# Patient Record
Sex: FEMALE | Race: WHITE | Employment: OTHER | URBAN - METROPOLITAN AREA
[De-identification: names, ages, dates, MRNs, and addresses within clinical notes are randomized per-mention and may not be internally consistent; named-entity substitution may affect disease eponyms.]

---

## 2017-10-30 ENCOUNTER — HOSPITAL ENCOUNTER (OUTPATIENT)
Dept: LAB | Age: 67
Discharge: HOME | End: 2017-10-30
Payer: MEDICARE

## 2017-10-30 ENCOUNTER — OFFICE VISIT (OUTPATIENT)
Dept: HEMATOLOGY | Age: 67
End: 2017-10-30

## 2017-10-30 VITALS
HEIGHT: 63 IN | RESPIRATION RATE: 18 BRPM | DIASTOLIC BLOOD PRESSURE: 89 MMHG | WEIGHT: 105 LBS | SYSTOLIC BLOOD PRESSURE: 193 MMHG | HEART RATE: 74 BPM | TEMPERATURE: 96.7 F | BODY MASS INDEX: 18.61 KG/M2 | OXYGEN SATURATION: 100 %

## 2017-10-30 DIAGNOSIS — B18.2 CHRONIC HEPATITIS C WITHOUT HEPATIC COMA (HCC): Primary | ICD-10-CM

## 2017-10-30 DIAGNOSIS — B18.2 CHRONIC HEPATITIS C WITHOUT HEPATIC COMA (HCC): ICD-10-CM

## 2017-10-30 LAB
ALBUMIN SERPL-MCNC: 4.3 G/DL (ref 3.4–5)
ALBUMIN/GLOB SERPL: 1.2 {RATIO} (ref 0.8–1.7)
ALP SERPL-CCNC: 44 U/L (ref 45–117)
ALT SERPL-CCNC: 13 U/L (ref 13–56)
ANION GAP SERPL CALC-SCNC: 8 MMOL/L (ref 3–18)
AST SERPL-CCNC: 17 U/L (ref 15–37)
BASOPHILS # BLD: 0 K/UL (ref 0–0.06)
BASOPHILS NFR BLD: 1 % (ref 0–2)
BILIRUB DIRECT SERPL-MCNC: 0.1 MG/DL (ref 0–0.2)
BILIRUB SERPL-MCNC: 0.3 MG/DL (ref 0.2–1)
BUN SERPL-MCNC: 10 MG/DL (ref 7–18)
BUN/CREAT SERPL: 13 (ref 12–20)
CALCIUM SERPL-MCNC: 9.3 MG/DL (ref 8.5–10.1)
CHLORIDE SERPL-SCNC: 108 MMOL/L (ref 100–108)
CO2 SERPL-SCNC: 29 MMOL/L (ref 21–32)
CREAT SERPL-MCNC: 0.76 MG/DL (ref 0.6–1.3)
DIFFERENTIAL METHOD BLD: ABNORMAL
EOSINOPHIL # BLD: 0.1 K/UL (ref 0–0.4)
EOSINOPHIL NFR BLD: 1 % (ref 0–5)
ERYTHROCYTE [DISTWIDTH] IN BLOOD BY AUTOMATED COUNT: 16 % (ref 11.6–14.5)
GLOBULIN SER CALC-MCNC: 3.5 G/DL (ref 2–4)
GLUCOSE SERPL-MCNC: 86 MG/DL (ref 74–99)
HCT VFR BLD AUTO: 40.7 % (ref 35–45)
HGB BLD-MCNC: 13.4 G/DL (ref 12–16)
LYMPHOCYTES # BLD: 1.4 K/UL (ref 0.9–3.6)
LYMPHOCYTES NFR BLD: 32 % (ref 21–52)
MCH RBC QN AUTO: 30.3 PG (ref 24–34)
MCHC RBC AUTO-ENTMCNC: 32.9 G/DL (ref 31–37)
MCV RBC AUTO: 92.1 FL (ref 74–97)
MONOCYTES # BLD: 0.3 K/UL (ref 0.05–1.2)
MONOCYTES NFR BLD: 7 % (ref 3–10)
NEUTS SEG # BLD: 2.6 K/UL (ref 1.8–8)
NEUTS SEG NFR BLD: 59 % (ref 40–73)
PLATELET # BLD AUTO: 148 K/UL (ref 135–420)
PMV BLD AUTO: 11.4 FL (ref 9.2–11.8)
POTASSIUM SERPL-SCNC: 5 MMOL/L (ref 3.5–5.5)
PROT SERPL-MCNC: 7.8 G/DL (ref 6.4–8.2)
RBC # BLD AUTO: 4.42 M/UL (ref 4.2–5.3)
SODIUM SERPL-SCNC: 145 MMOL/L (ref 136–145)
WBC # BLD AUTO: 4.4 K/UL (ref 4.6–13.2)

## 2017-10-30 PROCEDURE — 80048 BASIC METABOLIC PNL TOTAL CA: CPT | Performed by: NURSE PRACTITIONER

## 2017-10-30 PROCEDURE — 36415 COLL VENOUS BLD VENIPUNCTURE: CPT | Performed by: NURSE PRACTITIONER

## 2017-10-30 PROCEDURE — 85025 COMPLETE CBC W/AUTO DIFF WBC: CPT | Performed by: NURSE PRACTITIONER

## 2017-10-30 PROCEDURE — 80076 HEPATIC FUNCTION PANEL: CPT | Performed by: NURSE PRACTITIONER

## 2017-10-30 PROCEDURE — 87517 HEPATITIS B DNA QUANT: CPT | Performed by: NURSE PRACTITIONER

## 2017-10-30 PROCEDURE — 87522 HEPATITIS C REVRS TRNSCRPJ: CPT | Performed by: NURSE PRACTITIONER

## 2017-10-30 NOTE — MR AVS SNAPSHOT
Visit Information Date & Time Provider Department Dept. Phone Encounter #  
 10/30/2017  8:30 AM [facility] [address] [id number] Follow-up Instructions Return if symptoms worsen or fail to improve. Upcoming Health Maintenance Date Due DTaP/Tdap/Td series (1 - Tdap) 8/31/1969 BREAST CANCER SCRN MAMMOGRAM 8/31/1998 FOBT Q 1 YEAR AGE 50-75 8/31/1998 ZOSTER VACCINE AGE 60> 6/30/2008 GLAUCOMA SCREENING Q2Y 8/31/2013 OSTEOPOROSIS SCREENING (DEXA) 8/31/2013 Pneumococcal 65+ Low/Medium Risk (1 of 2 - PCV13) 8/31/2013 MEDICARE YEARLY EXAM 8/31/2013 INFLUENZA AGE 9 TO ADULT 8/1/2017 Allergies as of 10/30/2017  Review Complete On: 10/30/2017 By: Mihir Kapoor Severity Noted Reaction Type Reactions Ampicillin  09/23/2014    Other (comments) Codeine  09/23/2014    Other (comments) Pcn [Penicillins]  09/23/2014    Other (comments) Tetracyclines  09/23/2014    Other (comments) Current Immunizations  Never Reviewed No immunizations on file. Not reviewed this visit You Were Diagnosed With   
  
 Codes Comments Chronic hepatitis C without hepatic coma (HCC)    -  Primary ICD-10-CM: B18.2 ICD-9-CM: 070.54 Vitals BP Pulse Temp Resp Height(growth percentile) 193/89 (BP 1 Location: Left arm, BP Patient Position: Sitting) 74 96.7 °F (35.9 °C) (Tympanic) 18 5' 3\" (1.6 m) Weight(growth percentile) SpO2 BMI OB Status Smoking Status 105 lb (47.6 kg) 100% 18.6 kg/m2 Hysterectomy Current Some Day Smoker BMI and BSA Data Body Mass Index Body Surface Area  
 18.6 kg/m 2 1.45 m 2 Preferred Pharmacy Pharmacy Name Phone [facility] [address] [place] [address] [phone] Your Updated Medication List  
  
   
This list is accurate as of: 10/30/17  9:04 AM.  Always use your most recent med list.  
  
  
  
  
 aspirin 325 mg tablet Commonly known as:  ASPIRIN Take 325 mg by mouth daily. XANAX 2 mg tablet Generic drug:  ALPRAZolam  
Take  by mouth. Follow-up Instructions Return if symptoms worsen or fail to improve. To-Do List   
 10/30/2017 Imaging:  US ABD LTD W ELASTOGRAPHY Introducing [facility]! Dear Andrea Can: Thank you for requesting a Minic[employer] account. Our records indicate that you already have an active Minicabster account. You can access your account anytime at [email or web address] Did you know that you can access your hospital and ER discharge instructions at any time in Minicabster? You can also review all of your test results from your hospital stay or ER visit. Additional Information If you have questions, please visit the Frequently Asked Questions section of the Minic[employer]ster website at [email or web address] Remember, Minicabster is NOT to be used for urgent needs. For medical emergencies, dial 911. Now available from your iPhone and Android! Please provide this summary of care documentation to your next provider. Your primary care clinician is listed as Finn Dai. If you have any questions after today's visit, please call [phone].

## 2017-10-30 NOTE — PROGRESS NOTES
[address] Rohith Oh MD, [address], [facility], [place]       MAYO Temple PA-C Lianne Mano [facility]   MAYO Lindo NP        at [facility] AT [address], [address] [facility], [address]     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 She began HCV on 2016 with once daily Harvoni and completed the 12 weeks of therapy on 2016. She was SVR 12. This is the first time that her HCV has been treated. The active problem list, all pertinent past medical history, medications, liver histology, radiologic findings and laboratory findings related to the liver disorder were reviewed with the patient. The patient is a 69 y.o.  female who requested to be tested and was positive for HCV after her   of HCV and cirrhosis in . Ultrasound of the liver was performed in 10/2014. Results suggest that the liver is normal.      A liver biopsy has not been performed. The most recent laboratory studies indicate that the liver transaminases are normal, alkaline phosphatase is normal, tests of hepatic synthetic and metabolic function are normal, and the platelet count is depressed.       The patient complains of fatigue. The patient has functional limitations secondary to these complaints. \"I don't think that this has anything to do with my liver, I think it's my heart\". The patient has not experienced pain in the right side over the liver, arthralgias, myalgias, problems concentrating, swelling of the abdomen, swelling of the lower extremities, hematemesis, or hematochezia. ALLERGIES  Allergies   Allergen Reactions    Ampicillin Other (comments)    Codeine Other (comments)    Pcn [Penicillins] Other (comments)    Tetracyclines Other (comments)       MEDICATIONS  Current Outpatient Prescriptions   Medication Sig    aspirin (ASPIRIN) 325 mg tablet Take 325 mg by mouth daily.  alprazolam (XANAX) 2 mg tablet Take  by mouth. No current facility-administered medications for this visit. SYSTEM REVIEW NOT RELATED TO LIVER DISEASE OR REVIEWED ABOVE:  Constitution systems: Negative for fever, chills, weight gain, weight loss. Eyes: Negative for visual changes. ENT: Negative for sore throat, painful swallowing. Respiratory: Negative for cough, hemoptysis, SOB. Cardiology: Negative for chest pain, palpitations. GI:  Negative for constipation or diarrhea. : Negative for urinary frequency, dysuria, hematuria, nocturia. Skin: Negative for rash. Hematology: Negative for easy bruising, blood clots. Musculo-skelatal: Negative for back pain, muscle pain, weakness. Neurologic: Negative for headaches, dizziness, vertigo, memory problems not related to HE. Psychology: Negative for anxiety, depression. FAMILY HISTORY:  The father  of parkinsons disease. The mother  of heart disease. There is a brother with heart disease. There are no other persons in the family with HCV or liver disease. Her spouse passed away in  from complications of cirrhosis secondary to HCV. SOCIAL HISTORY:  The patient is . The patient has no children.      The patient currently smokes 1 pack of tobacco daily. The patient consumes alcohol on social occasions never in excess. The patient used to work as a . The patient has not worked since [address] [place], [place] [address]. PHYSICAL EXAMINATION:  Visit Vitals    /89 (BP 1 Location: Left arm, BP Patient Position: Sitting)    Pulse 74    Temp 96.7 °F (35.9 °C) (Tympanic)    Resp 18    Ht 5' 3\" (1.6 m)    Wt 105 lb (47.6 kg)    SpO2 100%    BMI 18.6 kg/m2     General: No acute distress. Eyes: Sclera anicteric. ENT: No oral lesions. Thyroid normal.  Nodes: No adenopathy. Skin: No spider angiomata. No jaundice. No palmar erythema. Respiratory: Lungs clear to auscultation. Cardiovascular: Regular heart rate. No murmurs. No JVD. Abdomen: Soft non-tender. Liver size normal to percussion/palpation. Spleen not palpable. No obvious ascites. Extremities: No edema. No muscle wasting. No gross arthritic changes. Neurologic: Alert and oriented. Cranial nerves grossly intact. No asterixsis. LABORATORY STUDIES:  Liver [facility] Ref Rng 9/15/2016 7/28/2016 4/27/2016   WBC 4.6 - 13.2 K/uL 5.7 6.2 6.7   ANC 1.8 - 8.0 K/UL 3.4 4.0 4.3   HGB 12.0 - 16.0 g/dL 14.2 15.1 15.6    - 420 K/uL 141 135 147   AST 15 - 37 U/L 18 19 24   ALT 13 - 56 U/L 15 15 18   Alk Phos 45 - 117 U/L 44 (L) 52 53   Bili, Total 0.2 - 1.0 MG/DL 0.4 0.4 0.5   Bili, Direct 0.0 - 0.2 MG/DL 0.1 0.1 0.2   Albumin 3.4 - 5.0 g/dL 4.2 4.1 4.1   BUN 7.0 - 18 MG/DL 8 9 9   Creat 0.6 - 1.3 MG/DL 0.73 0.76 0.67   Na 136 - 145 mmol/L 141 141 144   K 3.5 - 5.5 mmol/L 4.9 4.8 4.6   Cl 100 - 108 mmol/L 104 103 104   CO2 21 - 32 mmol/L 33 (H) 29 28   Glucose 74 - 99 mg/dL 87 85 88     SEROLOGIES:  Serologies Latest Ref Rng 11/5/2014 9/23/2014   Hep A Ab, Total Negative   Negative   Hep C Genotype See Note 4  CANCELED     11/2014. HBsurface antigen negative, anti-HBcore positive, anti-HCV positive, HCV RNA Log 6.5 IU/ml, anti-HCV negative.     LIVER HISTOLOGY:  11/2014. Fibrosure labs test.  F1.     ENDOSCOPIC PROCEDURES:  Not available or performed    RADIOLOGY:  10/2014. Ultrasound of the liver. Normal appearing liver. No masses. OTHER TESTING:  Not available or performed    ASSESSMENT AND PLAN:  Chronic HCV with Fibrosure suggesting stage 1 fibrosis. The most recent laboratory studies indicate that the liver transaminases are normal, alkaline phosphatase is normal, tests of hepatic synthetic and metabolic function are normal, and the platelet count is normal.  Based upon imaging and laboratory studies the patient does not appear to have advanced liver disease or cirrhosis. Will perform laboratory testing to monitor liver function and degree of liver injury. This will include hepatic panel, a CBC w/ diff, a BMP, and HCV RNA. The need to perform an assessment of liver fibrosis was discussed with the patient. Elastography  can assess liver fibrosis and determine if a patient has advanced fibrosis or cirrhosis without the need for liver biopsy. This can also be performed with ultrasound. This was ordered today in anticipation of the patient's discharge from our practice. HCV. The patient has genotype 4. She was treated with 12 weeks of Aydin Tera from 03/30/2016 through 06/21/2016. She had not detectable virus 12 weeks post treatment. She has not previously been treated. All complaints related to treatment have resolved. Patient is cured of HCV. The patient was directed to continue all current medications at the current dosages. There are no contraindications for the patient to take any medications that are necessary for treatment of other medical issues. The patient was counseled regarding alcohol consumption. Vaccination for viral hepatitis A is recommended since the patient has no serologic evidence of previous exposure or vaccination with immunity. Vaccination for viral hepatitis B is not needed.   The patient has serologic evidence of prior exposure or vaccination with immunity. Follow-up [address] 32 on a PRN basis.      IDALIA Morgan  [facility] [place] [facility] [address], [address] [place] [address], [address] [phone]

## 2017-11-01 LAB
HBV DNA SERPL NAA+PROBE-ACNC: NORMAL IU/ML
HBV DNA SERPL NAA+PROBE-LOG IU: NORMAL LOG10IU/ML
TEST INFORMATION: NORMAL

## 2017-11-08 ENCOUNTER — HOSPITAL ENCOUNTER (OUTPATIENT)
Dept: ULTRASOUND IMAGING | Age: 67
Discharge: HOME | End: 2017-11-08
Payer: MEDICARE

## 2017-11-08 DIAGNOSIS — B18.2 CHRONIC HEPATITIS C WITHOUT HEPATIC COMA (HCC): ICD-10-CM

## 2017-11-08 PROCEDURE — 0346T US ABD LTD W ELASTOGRAPHY: CPT

## 2017-11-13 ENCOUNTER — TELEPHONE (OUTPATIENT)
Dept: HEMATOLOGY | Age: 67
End: 2017-11-13

## 2017-11-13 NOTE — TELEPHONE ENCOUNTER
Updated on current labs, imaging, and elastography. Elastography suggests a Metavir score on F3. Follow up in one year.

## 2017-11-17 LAB
HCV GRAPH, HCVGR: NORMAL
HCV RNA SERPL NAA+PROBE-ACNC: NORMAL IU/ML
SERIAL MONITORING: NORMAL

## 2018-08-27 ENCOUNTER — APPOINTMENT (OUTPATIENT)
Dept: CT IMAGING | Age: 68
DRG: 066 | End: 2018-08-27
Attending: EMERGENCY MEDICINE
Payer: MEDICARE

## 2018-08-27 ENCOUNTER — APPOINTMENT (OUTPATIENT)
Dept: MRI IMAGING | Age: 68
DRG: 066 | End: 2018-08-27
Attending: EMERGENCY MEDICINE
Payer: MEDICARE

## 2018-08-27 ENCOUNTER — APPOINTMENT (OUTPATIENT)
Dept: GENERAL RADIOLOGY | Age: 68
DRG: 066 | End: 2018-08-27
Attending: EMERGENCY MEDICINE
Payer: MEDICARE

## 2018-08-27 ENCOUNTER — HOSPITAL ENCOUNTER (INPATIENT)
Age: 68
LOS: 1 days | Discharge: HOME HEALTH | DRG: 066 | End: 2018-08-28
Attending: EMERGENCY MEDICINE | Admitting: HOSPITALIST
Payer: MEDICARE

## 2018-08-27 DIAGNOSIS — I63.9 CEREBROVASCULAR ACCIDENT (CVA), UNSPECIFIED MECHANISM (HCC): Primary | ICD-10-CM

## 2018-08-27 DIAGNOSIS — I15.9 SECONDARY HYPERTENSION: ICD-10-CM

## 2018-08-27 LAB
ALBUMIN SERPL-MCNC: 4.4 G/DL (ref 3.4–5)
ALBUMIN/GLOB SERPL: 1.1 {RATIO} (ref 0.8–1.7)
ALP SERPL-CCNC: 46 U/L (ref 45–117)
ALT SERPL-CCNC: 19 U/L (ref 13–56)
ANION GAP SERPL CALC-SCNC: 8 MMOL/L (ref 3–18)
APPEARANCE UR: CLEAR
APTT PPP: 27.5 SEC (ref 23–36.4)
AST SERPL-CCNC: 43 U/L (ref 15–37)
BACTERIA URNS QL MICRO: NEGATIVE /HPF
BASOPHILS # BLD: 0 K/UL (ref 0–0.1)
BASOPHILS NFR BLD: 0 % (ref 0–2)
BILIRUB SERPL-MCNC: 0.5 MG/DL (ref 0.2–1)
BILIRUB UR QL: NEGATIVE
BUN SERPL-MCNC: 15 MG/DL (ref 7–18)
BUN/CREAT SERPL: 20 (ref 12–20)
CALCIUM SERPL-MCNC: 9.4 MG/DL (ref 8.5–10.1)
CHLORIDE SERPL-SCNC: 103 MMOL/L (ref 100–108)
CK MB CFR SERPL CALC: 1.4 % (ref 0–4)
CK MB SERPL-MCNC: 3.4 NG/ML (ref 5–25)
CK SERPL-CCNC: 240 U/L (ref 26–192)
CO2 SERPL-SCNC: 28 MMOL/L (ref 21–32)
COLOR UR: YELLOW
CREAT SERPL-MCNC: 0.76 MG/DL (ref 0.6–1.3)
DIFFERENTIAL METHOD BLD: ABNORMAL
EOSINOPHIL # BLD: 0 K/UL (ref 0–0.4)
EOSINOPHIL NFR BLD: 1 % (ref 0–5)
EPITH CASTS URNS QL MICRO: NORMAL /LPF (ref 0–5)
ERYTHROCYTE [DISTWIDTH] IN BLOOD BY AUTOMATED COUNT: 14.7 % (ref 11.6–14.5)
GLOBULIN SER CALC-MCNC: 4 G/DL (ref 2–4)
GLUCOSE SERPL-MCNC: 92 MG/DL (ref 74–99)
GLUCOSE UR STRIP.AUTO-MCNC: NEGATIVE MG/DL
HBA1C MFR BLD: 5.1 % (ref 4.5–5.6)
HCT VFR BLD AUTO: 40 % (ref 35–45)
HGB BLD-MCNC: 13.2 G/DL (ref 12–16)
HGB UR QL STRIP: ABNORMAL
INR PPP: 1 (ref 0.8–1.2)
KETONES UR QL STRIP.AUTO: NEGATIVE MG/DL
LEUKOCYTE ESTERASE UR QL STRIP.AUTO: NEGATIVE
LYMPHOCYTES # BLD: 1.3 K/UL (ref 0.9–3.6)
LYMPHOCYTES NFR BLD: 25 % (ref 21–52)
MAGNESIUM SERPL-MCNC: 2.4 MG/DL (ref 1.6–2.6)
MCH RBC QN AUTO: 30.5 PG (ref 24–34)
MCHC RBC AUTO-ENTMCNC: 33 G/DL (ref 31–37)
MCV RBC AUTO: 92.4 FL (ref 74–97)
MONOCYTES # BLD: 0.3 K/UL (ref 0.05–1.2)
MONOCYTES NFR BLD: 7 % (ref 3–10)
NEUTS SEG # BLD: 3.3 K/UL (ref 1.8–8)
NEUTS SEG NFR BLD: 67 % (ref 40–73)
NITRITE UR QL STRIP.AUTO: NEGATIVE
PH UR STRIP: 5.5 [PH] (ref 5–8)
PLATELET # BLD AUTO: 159 K/UL (ref 135–420)
PMV BLD AUTO: 11.3 FL (ref 9.2–11.8)
POTASSIUM SERPL-SCNC: 5 MMOL/L (ref 3.5–5.5)
PROT SERPL-MCNC: 8.4 G/DL (ref 6.4–8.2)
PROT UR STRIP-MCNC: NEGATIVE MG/DL
PROTHROMBIN TIME: 12.8 SEC (ref 11.5–15.2)
RBC # BLD AUTO: 4.33 M/UL (ref 4.2–5.3)
RBC #/AREA URNS HPF: NORMAL /HPF (ref 0–5)
SODIUM SERPL-SCNC: 139 MMOL/L (ref 136–145)
SP GR UR REFRACTOMETRY: 1.01 (ref 1–1.03)
TROPONIN I SERPL-MCNC: <0.02 NG/ML (ref 0–0.06)
UROBILINOGEN UR QL STRIP.AUTO: 0.2 EU/DL (ref 0.2–1)
WBC # BLD AUTO: 5 K/UL (ref 4.6–13.2)
WBC URNS QL MICRO: NEGATIVE /HPF (ref 0–5)

## 2018-08-27 PROCEDURE — 74011250636 HC RX REV CODE- 250/636: Performed by: HOSPITALIST

## 2018-08-27 PROCEDURE — 71045 X-RAY EXAM CHEST 1 VIEW: CPT

## 2018-08-27 PROCEDURE — 74011250637 HC RX REV CODE- 250/637: Performed by: HOSPITALIST

## 2018-08-27 PROCEDURE — A9575 INJ GADOTERATE MEGLUMI 0.1ML: HCPCS | Performed by: EMERGENCY MEDICINE

## 2018-08-27 PROCEDURE — 65660000000 HC RM CCU STEPDOWN

## 2018-08-27 PROCEDURE — 74011000250 HC RX REV CODE- 250: Performed by: HOSPITALIST

## 2018-08-27 PROCEDURE — 83735 ASSAY OF MAGNESIUM: CPT | Performed by: EMERGENCY MEDICINE

## 2018-08-27 PROCEDURE — 85730 THROMBOPLASTIN TIME PARTIAL: CPT | Performed by: EMERGENCY MEDICINE

## 2018-08-27 PROCEDURE — 74011250636 HC RX REV CODE- 250/636: Performed by: EMERGENCY MEDICINE

## 2018-08-27 PROCEDURE — 85025 COMPLETE CBC W/AUTO DIFF WBC: CPT | Performed by: EMERGENCY MEDICINE

## 2018-08-27 PROCEDURE — 70553 MRI BRAIN STEM W/O & W/DYE: CPT

## 2018-08-27 PROCEDURE — 82550 ASSAY OF CK (CPK): CPT | Performed by: EMERGENCY MEDICINE

## 2018-08-27 PROCEDURE — 80053 COMPREHEN METABOLIC PANEL: CPT | Performed by: EMERGENCY MEDICINE

## 2018-08-27 PROCEDURE — 70450 CT HEAD/BRAIN W/O DYE: CPT

## 2018-08-27 PROCEDURE — 96360 HYDRATION IV INFUSION INIT: CPT

## 2018-08-27 PROCEDURE — 93005 ELECTROCARDIOGRAM TRACING: CPT

## 2018-08-27 PROCEDURE — 85610 PROTHROMBIN TIME: CPT | Performed by: EMERGENCY MEDICINE

## 2018-08-27 PROCEDURE — 99285 EMERGENCY DEPT VISIT HI MDM: CPT

## 2018-08-27 PROCEDURE — 83036 HEMOGLOBIN GLYCOSYLATED A1C: CPT | Performed by: HOSPITALIST

## 2018-08-27 PROCEDURE — 81001 URINALYSIS AUTO W/SCOPE: CPT | Performed by: EMERGENCY MEDICINE

## 2018-08-27 RX ORDER — ASPIRIN 325 MG
325 TABLET ORAL
Status: DISCONTINUED | OUTPATIENT
Start: 2018-08-27 | End: 2018-08-27

## 2018-08-27 RX ORDER — IBUPROFEN 200 MG
1 TABLET ORAL DAILY
Status: DISCONTINUED | OUTPATIENT
Start: 2018-08-27 | End: 2018-08-28 | Stop reason: HOSPADM

## 2018-08-27 RX ORDER — GADOTERATE MEGLUMINE 376.9 MG/ML
10 INJECTION INTRAVENOUS
Status: COMPLETED | OUTPATIENT
Start: 2018-08-27 | End: 2018-08-27

## 2018-08-27 RX ORDER — ENOXAPARIN SODIUM 100 MG/ML
40 INJECTION SUBCUTANEOUS EVERY 24 HOURS
Status: DISCONTINUED | OUTPATIENT
Start: 2018-08-27 | End: 2018-08-28 | Stop reason: HOSPADM

## 2018-08-27 RX ORDER — SODIUM CHLORIDE 9 MG/ML
100 INJECTION, SOLUTION INTRAVENOUS CONTINUOUS
Status: DISCONTINUED | OUTPATIENT
Start: 2018-08-27 | End: 2018-08-28 | Stop reason: HOSPADM

## 2018-08-27 RX ORDER — ASPIRIN 325 MG
325 TABLET ORAL DAILY
Status: DISCONTINUED | OUTPATIENT
Start: 2018-08-28 | End: 2018-08-28

## 2018-08-27 RX ORDER — ALPRAZOLAM 0.5 MG/1
1 TABLET ORAL
Status: DISCONTINUED | OUTPATIENT
Start: 2018-08-27 | End: 2018-08-28 | Stop reason: HOSPADM

## 2018-08-27 RX ORDER — METOPROLOL TARTRATE 5 MG/5ML
5 INJECTION INTRAVENOUS
Status: DISCONTINUED | OUTPATIENT
Start: 2018-08-27 | End: 2018-08-28 | Stop reason: HOSPADM

## 2018-08-27 RX ORDER — ATORVASTATIN CALCIUM 20 MG/1
40 TABLET, FILM COATED ORAL DAILY
Status: DISCONTINUED | OUTPATIENT
Start: 2018-08-27 | End: 2018-08-28 | Stop reason: HOSPADM

## 2018-08-27 RX ADMIN — ALPRAZOLAM 1 MG: 0.5 TABLET ORAL at 22:44

## 2018-08-27 RX ADMIN — SODIUM CHLORIDE 1000 ML: 900 INJECTION, SOLUTION INTRAVENOUS at 13:51

## 2018-08-27 RX ADMIN — GADOTERATE MEGLUMINE 10 ML: 376.9 INJECTION INTRAVENOUS at 15:32

## 2018-08-27 RX ADMIN — METOPROLOL TARTRATE 5 MG: 5 INJECTION, SOLUTION INTRAVENOUS at 22:16

## 2018-08-27 RX ADMIN — SODIUM CHLORIDE 100 ML/HR: 900 INJECTION, SOLUTION INTRAVENOUS at 21:56

## 2018-08-27 RX ADMIN — ENOXAPARIN SODIUM 40 MG: 40 INJECTION, SOLUTION INTRAVENOUS; SUBCUTANEOUS at 21:56

## 2018-08-27 NOTE — ROUTINE PROCESS
TRANSFER - OUT REPORT:    Verbal report given to [facility] nurse on JERZY Vivar  being transferred to [address] [place] [address] for routine progression of care       Report consisted of patients Situation, Background, Assessment and   Recommendations(SBAR). Information from the following report(s) Cardiac Rhythm SR was reviewed with the receiving nurse. Lines:   Peripheral IV 08/27/18 Left; Outer Forearm (Active)   Site Assessment Clean, dry, & intact 8/27/2018  1:15 PM   Phlebitis Assessment 0 8/27/2018  1:15 PM   Infiltration Assessment 0 8/27/2018  1:15 PM   Dressing Status Clean, dry, & intact 8/27/2018  1:15 PM   Dressing Type Tape;Transparent 8/27/2018  1:15 PM   Hub Color/Line Status Yellow;Capped;Flushed 8/27/2018  1:15 PM   Action Taken Blood drawn 8/27/2018  1:15 PM   Alcohol Cap Used Yes 8/27/2018  1:15 PM        Opportunity for questions and clarification was provided.       Patient transported with:   Registered Nurse, monitor

## 2018-08-27 NOTE — ED NOTES
NEUROLOGY PRELIMINARY NOTE    Patient: Pillo Mcnamara        Sex: female          DOA: 8/27/2018  YOB: 1950      Age:  69 y.o.         LOS: 0 days       Comment: Discussed the case with the attending, Dr. Carlos Salcedo. New stroke. Will see the patient in AM.    Initial recommendations:  Please use stroke admission order sets. 1. Aspirin 325mg po now (given). Will decide for furtehr medical management points after CTA and echo. 2. Admit to telemetry with tele monitoring  3. Neuro checks per stroke protocol  4. Permissive hypertension (do not treat if BP is not >200/110, prefer prn labetolol 5mg)  5. IV normal saline continuous infusion 100-125 ml/h  6. Euglycemia with sliding scale insulin  7. Euthermia with acetaminophen 650mg Q6h prn for fever  8. Avoid urinary catheters please. 9. CTA of head and neck  10. Echocardiogram with bubble study  11. Lipid panel  12. Hemoglobin A1c  13. SCDs and DVT prophylaxis   14. PT/OT and SLP consults    Further recommendations to follow.     Signed:  Loc Garcia MD  8/27/2018  6:38 PM

## 2018-08-27 NOTE — ED NOTES
2 Peyton De León called to inform that SBAR is ready. Ortho nurse answered the phone- this RN asked her to inform nurse for [phone] that SBAR is ready and to ask that nurse to call ED.

## 2018-08-27 NOTE — ED NOTES
Patient ambulated to bathroom with stand by assistance. Patient holds to walls and chairs for support. Patient is requesting food and drink and says she is getting grouchy because she hasn't been able to smoke.

## 2018-08-27 NOTE — ED NOTES
On assessment, L  is weaker than R. L leg is weaker than R. No facial droop, tongue is midline. Speech is slurred and patient endorses this. Patient reports s/s started 2 weeks ago with numbness/tingline in the L foot that has progressed to the R leg, L arm and hand and now having slurred speech. States she is not having new vision issues- waiting for new glasses per pt. /75.

## 2018-08-27 NOTE — IP AVS SNAPSHOT
[address] [place] [address] 
[phone] Patient: Jesús Santana MRN: [id number] CCM:4/71/0929 About your hospitalization You were admitted on:  August 27, 2018 You last received care in the:  [facility] You were discharged on:  August 28, 2018 Why you were hospitalized Your primary diagnosis was:  Stroke (Hcc) Your diagnoses also included:  Htn (Hypertension) Follow-up Information Follow up With Details Comments Contact Info Erica Zepeda MD Schedule an appointment as soon as possible for a visit  [address] [address] 
[phone] Marco Campos MD  [de-identified] office will call patient at home to schedule a follow-up appointment. [address] [facility] [address] [facility] 
[phone] [address] [facility] [place],[address] to continue managing your healthcare needs. [facility] [phone] Discharge Orders None A check suki indicates which time of day the medication should be taken. My Medications START taking these medications Instructions Each Dose to Equal  
 Morning Noon Evening Bedtime  
 aspirin delayed-release 81 mg tablet Start taking on:  8/29/2018 Replaces:  aspirin 325 mg tablet Your last dose was: Your next dose is: Take 1 Tab by mouth daily. 81 mg  
    
   
   
   
  
 atorvastatin 40 mg tablet Commonly known as:  LIPITOR Start taking on:  8/29/2018 Your last dose was: Your next dose is: Take 1 Tab by mouth daily. 40 mg  
    
   
   
   
  
 clopidogrel 75 mg Tab Commonly known as:  PLAVIX Start taking on:  8/29/2018 Your last dose was: Your next dose is: Take 1 Tab by mouth daily. 75 mg  
    
   
   
   
  
 hydroCHLOROthiazide 25 mg tablet Commonly known as:  HYDRODIURIL  
   
 Your last dose was: Your next dose is: Take 1 Tab by mouth daily. 25 mg  
    
   
   
   
  
 lisinopril 20 mg tablet Commonly known as:  Olga Whitecone Your last dose was: Your next dose is: Take 1 Tab by mouth daily. 20 mg CONTINUE taking these medications Instructions Each Dose to Equal  
 Morning Noon Evening Bedtime AZASITE 1 % ophthalmic solution Generic drug:  azithromycin Your last dose was: Your next dose is:    
   
   
 Administer 1 Drop to both eyes two (2) times a day. 1 Drop STOP taking these medications   
 aspirin 325 mg tablet Commonly known as:  ASPIRIN Replaced by:  aspirin delayed-release 81 mg tablet XANAX 2 mg tablet Generic drug:  ALPRAZolam  
   
  
  
  
Where to Get Your Medications These medications were sent to [address] [place] [address]. [facility] [address] [place] [address] Phone:  [phone]  
  aspirin delayed-release 81 mg tablet  
 atorvastatin 40 mg tablet  
 clopidogrel 75 mg Tab  
 hydroCHLOROthiazide 25 mg tablet  
 lisinopril 20 mg tablet Discharge Instructions Stroke: Care Instructions Your Care Instructions You have had a stroke. This means that the blood flow to a part of your brain was blocked for some time, which damages the nerve cells in that part of the brain. The part of your body controlled by that part of your brain may not function properly now. The brain is an amazing organ that can heal itself to some degree. The stroke you had damaged part of your brain. But other parts of your brain may take over in some way for the damaged areas. You have already started this process. Your doctor will talk with you about what you can do to prevent another stroke.  High blood pressure, high cholesterol, and diabetes are all risk factors for stroke. If you have any of these conditions, work with your doctor to make sure they are under control. Other risk factors for stroke include being overweight, smoking, and not getting regular exercise. Going home may be hard for you and your family. The more you can try to do for yourself, the better. Remember to take each day one at a time. Follow-up care is a key part of your treatment and safety. Be sure to make and go to all appointments, and call your doctor if you are having problems. It's also a good idea to know your test results and keep a list of the medicines you take. How can you care for yourself at home? 
  · Enter a stroke rehabilitation (rehab) program, if your doctor recommends it. Physical, speech, and occupational therapies can help you manage bathing, dressing, eating, and other basics of daily living.  
  · Do not drive until your doctor says it is okay.  
  · It is normal to feel sad or depressed after a stroke. If these feelings last, talk to your doctor.  
  · If you are having problems with urine leakage, go to the bathroom at regular times, including when you first wake up and at bedtime. Also, limit fluids after dinner.  
  · If you are constipated, drink plenty of fluids, enough so that your urine is light yellow or clear like water. If you have kidney, heart, or liver disease and have to limit fluids, talk with your doctor before you increase the amount of fluids you drink. Set up a regular time for using the toilet. If you continue to have constipation, your doctor may suggest using a bulking agent, such as Metamucil, or a stool softener, laxative, or enema. Medicines 
  · Take your medicines exactly as prescribed. Call your doctor if you think you are having a problem with your medicine. You may be taking several medicines.  ACE (angiotensin-converting enzyme) inhibitors, angiotensin II receptor blockers (ARBs), beta-blockers, diuretics (water pills), and calcium channel blockers control your blood pressure. Statins help lower cholesterol. Your doctor may also prescribe medicines for depression, pain, sleep problems, anxiety, or agitation.  
  · If your doctor has given you a blood thinner to prevent another stroke, be sure you get instructions about how to take your medicine safely. Blood thinners can cause serious bleeding problems.  
  · Do not take any over-the-counter medicines or herbal products without talking to your doctor first.  
  · If you take birth control pills or hormone therapy, talk to your doctor about whether they are right for you.  
 For family members and caregivers 
  · Make the home safe. Set up a room so that your loved one does not have to climb stairs. Be sure the bathroom is on the same floor. Move throw rugs and furniture that could cause falls. Make sure that the lighting is good. Put grab bars and seats in tubs and showers.  
  · Find out what your loved one can do and what he or she needs help with. Try not to do things for your loved one that your loved one can do on his or her own. Help him or her learn and practice new skills.  
  · Visit and talk with your loved one often. Try doing activities together that you both enjoy, such as playing cards or board games. Keep in touch with your loved one's friends as much as you can. Encourage them to visit.  
  · Take care of yourself. Do not try to do everything yourself. Ask other family members to help. Eat well, get enough rest, and take time to do things that you enjoy. Keep up with your own doctor visits, and make sure to take your medicines regularly. Get out of the house as much as you can. Join a local support group. Find out if you qualify for home health care visits to help with rehab or for adult day care. When should you call for help? Call 911 anytime you think you may need emergency care. For example, call if:   · You have signs of another stroke. These may include: 
¨ Sudden numbness, tingling, weakness, or loss of movement in your face, arm, or leg, especially on only one side of your body. ¨ Sudden vision changes. ¨ Sudden trouble speaking. ¨ Sudden confusion or trouble understanding simple statements. ¨ Sudden problems with walking or balance. ¨ A sudden, severe headache that is different from past headaches. Call 911 even if these symptoms go away in a few minutes.  
 Call your doctor now or seek immediate medical care if: 
  · You have new symptoms that may be related to your stroke, such as falls or trouble swallowing.  
 Watch closely for changes in your health, and be sure to contact your doctor if you have any problems. Where can you learn more? Go to http://ana-leonardo.info/. Enter D638 in the search box to learn more about \"Stroke: Care Instructions. \" Current as of: November 21, 2017 Content Version: 11.7 © 4347-7878 [employer]. Care instructions adapted under license by [employer] (which disclaims liability or warranty for this information). If you have questions about a medical condition or this instruction, always ask your healthcare professional. [place] [address] any warranty or liability for your use of this information. Patient armband removed and shredded [employer] technology Announcement We are excited to announce that we are making your provider's discharge notes available to you in [employer]. You will see these notes when they are completed and signed by the physician that discharged you from your recent hospital stay. If you have any questions or concerns about any information you see in [employer], please call the Health Information Department where you were seen or reach out to your Primary Care Provider for more information about your plan of care. Introducing [facility]! Dear Ino Yeboah: Thank you for requesting a [employer] account. Our records indicate that you already have an active [employer] account. You can access your account anytime at [email or web address] Did you know that you can access your hospital and ER discharge instructions at any time in [employer]? You can also review all of your test results from your hospital stay or ER visit. Additional Information If you have questions, please visit the Frequently Asked Questions section of the [employer] website at [email or web address] Remember, [employer] is NOT to be used for urgent needs. For medical emergencies, dial 911. Now available from your iPhone and Android! Introducing Tanner Lane As a [facility] patient, I wanted to make you aware of our electronic visit tool called Tanner Lane. [employer] allows you to connect within minutes with a medical provider 24 hours a day, seven days a week via a mobile device or tablet or logging into a secure website from your computer. You can access Tanner Lane from anywhere in the [place]. A virtual visit might be right for you when you have a simple condition and feel like you just dont want to get out of bed, or cant get away from work for an appointment, when your regular [facility] provider is not available (evenings, weekends or holidays), or when youre out of town and need minor care. Electronic visits cost only $49 and if the [employer] provider determines a prescription is needed to treat your condition, one can be electronically transmitted to a nearby pharmacy*. Please take a moment to enroll today if you have not already done so. The enrollment process is free and takes just a few minutes. To enroll, please download the [employer] kj to your tablet or phone, or visit [email or web address] to enroll on your computer. And, as an [address] [place] [address] patient with a [employer] account, the results of your visits will be scanned into your electronic medical record and your primary care provider will be able to view the scanned results. We urge you to continue to see your regular [employer] provider for your ongoing medical care. And while your primary care provider may not be the one available when you seek a Tanner Asherfin virtual visit, the peace of mind you get from getting a real diagnosis real time can be priceless. For more information on [employer], view our Frequently Asked Questions (FAQs) at [email or web address]. Sincerely, 
 
Valentino Milks, MD 
Chief Medical Officer Feliciano Hauser *:  certain medications cannot be prescribed via [employer] Unresulted Labs-Please follow up with your PCP about these lab tests Order Current Status EKG, 12 LEAD, INITIAL Preliminary result Providers Seen During Your Hospitalization Provider Specialty Primary office phone General Shahzad DO Emergency Medicine [phone] Ernie Lunsford MD [facility] [phone] Your Primary Care Physician (PCP) Primary Care Physician Office Phone Office Fax Oz Umanzor [phone] You are allergic to the following Allergen Reactions Ampicillin Other (comments) Codeine Other (comments) Pcn (Penicillins) Other (comments) Tetracyclines Other (comments) Recent Documentation Height Weight BMI OB Status Smoking Status 1.6 m 44 kg 17.18 kg/m2 Hysterectomy Current Some Day Smoker Emergency Contacts Name Discharge Info Relation Home Work Mobile Ilya Alcazar (Sister) DISCHARGE CAREGIVER [3] Other Relative [6] [phone] Patient Belongings The following personal items are in your possession at time of discharge: Dental Appliances: Uppers, Lowers  Visual Aid: Glasses, With patient      Home Medications: None   Jewelry: None  Clothing: At bedside    Other Valuables: Cell Phone Please provide this summary of care documentation to your next provider. Signatures-by signing, you are acknowledging that this After Visit Summary has been reviewed with you and you have received a copy. Patient Signature:  ____________________________________________________________ Date:  ____________________________________________________________  
  
[facility] Signature:  ____________________________________________________________ Date:  ____________________________________________________________

## 2018-08-27 NOTE — ED PROVIDER NOTES
EMERGENCY DEPARTMENT HISTORY AND PHYSICAL EXAM    Date: 8/27/2018  Patient Name: Andrew Morataya    History of Presenting Illness     Chief Complaint   Patient presents with    Numbness    Dysarthria         History Provided By: Patient    Chief Complaint: Numbness  Duration: 1 Weeks  Timing:  Acute  Location: Left-sided  Quality: Numbness  Modifying Factors: Weakness/numbness is exacerbated in the morning. Associated Symptoms: slurred speech x 1 week, left-sided weakness, gait problem secondary to numbness and weakness, and pulsing headache    Additional History (Context):   12:18 PM  Andrew Morataya is a 69 y.o. female with PMHx of CAD and HTN who presents to the emergency department C/O worsening left-sided numbness onset 1 week ago. Associated sxs include slurred speech x 1 week, left-sided weakness, gait problem secondary to numbness and weakness, and pulsing headache. Weakness/numbness is exacerbated in the morning. Pt reports the numbness began in her left foot and has worsened up her left side up. Pt was being seen by Dr. Addison Antonio, her PCP, today and was referred her for further evaluation. Pt reports she has had a stress week due to health issues with her brother. Pt takes an Aspirin daily. Reports Hx of a lazy eye and an eye infection under glands. Being seen at [facility] and treated with eye drops. Reports Hx of 2 angioplastys and has not seen a cardiology in 30 years. Endorses tobacco use and social drinking. Endorses marijuana use. Pt denies chest pain, SOB, abdominal pain, nausea, vomiting, fever, chills, and any other sxs or complaints.      PCP: Sanju Tamez MD    Current Facility-Administered Medications   Medication Dose Route Frequency Provider Last Rate Last Dose    iopamidol (ISOVUE-370) 76 % injection 100 mL  100 mL IntraVENous RAD ONCE Ayse Courtney DO        atorvastatin (LIPITOR) tablet 40 mg  40 mg Oral DAILY Kezia Jarrell MD         Current Outpatient Prescriptions   Medication Sig Dispense Refill    azithromycin (AZASITE) 1 % ophthalmic solution Administer 1 Drop to both eyes two (2) times a day.  aspirin (ASPIRIN) 325 mg tablet Take 325 mg by mouth daily.  alprazolam (XANAX) 2 mg tablet Take  by mouth. Past History     Past Medical History:  Past Medical History:   Diagnosis Date    CAD (coronary artery disease)     Hypertension        Past Surgical History:  Past Surgical History:   Procedure Laterality Date    HX HYSTERECTOMY      HX REFRACTIVE SURGERY         Family History:  History reviewed. No pertinent family history. Social History:  Social History   Substance Use Topics    Smoking status: Current Some Day Smoker     Packs/day: 1.00     Types: Cigarettes    Smokeless tobacco: Never Used    Alcohol use 0.0 oz/week     0 Standard drinks or equivalent per week      Comment: occasionally       Allergies: Allergies   Allergen Reactions    Ampicillin Other (comments)    Codeine Other (comments)    Pcn [Penicillins] Other (comments)    Tetracyclines Other (comments)         Review of Systems   Review of Systems   Constitutional: Negative for chills and fever. Respiratory: Negative for shortness of breath. Cardiovascular: Negative for chest pain. Gastrointestinal: Negative for abdominal pain, nausea and vomiting. Musculoskeletal: Positive for gait problem (secondary to numbness and weakness). Neurological: Positive for speech difficulty (slurred), weakness (left-sided), numbness (left-sided) and headaches (pulsing). All other systems reviewed and are negative. Physical Exam     Vitals:    08/27/18 1747 08/27/18 1753 08/27/18 1830 08/27/18 1831   BP:   199/80 199/80   Pulse: 60 63 70 71   Resp: 15 21 19 16   Temp:    98.1 °F (36.7 °C)   SpO2: 100% 100% 100% 100%   Weight:       Height:         Physical Exam   Nursing note and vitals reviewed.   Constitutional: Elderly  female, no acute distress, Initially very hypertensive in triage but improved spontaneously. Head: Normocephalic, Atraumatic  Eyes: Pupils are 3 mm bilaterally and reactive to light, strabismus on the right, no scleral icterus, no conjunctival pallor  ENT: moist mucous membranes, hearing intact  Neck: Supple, non-tender  Cardiovascular: Regular rate and rhythm, no murmurs, rubs, or gallops  Chest: Normal work of breathing and chest excursion bilaterally  Lungs: Clear to ausculation bilaterally, no wheezes, no rhonchi  Abdomen: Soft, non tender, non distended, normoactive bowel sounds  Back: No evidence of trauma or deformity  Extremities: No evidence of trauma or deformity, no LE edema  Skin: Warm and dry, normal cap refill  Neuro: Alert and appropriate, mild slurring of speech with mild facial asymmetry, finger to nose and heel to shin abnormal on the left, LLQ drift, NIH 5  Psychiatric: Cooperative, appropriate mood      Diagnostic Study Results     Labs -     Recent Results (from the past 12 hour(s))   CBC WITH AUTOMATED DIFF    Collection Time: 08/27/18  1:15 PM   Result Value Ref Range    WBC 5.0 4.6 - 13.2 K/uL    RBC 4.33 4.20 - 5.30 M/uL    HGB 13.2 12.0 - 16.0 g/dL    HCT 40.0 35.0 - 45.0 %    MCV 92.4 74.0 - 97.0 FL    MCH 30.5 24.0 - 34.0 PG    MCHC 33.0 31.0 - 37.0 g/dL    RDW 14.7 (H) 11.6 - 14.5 %    PLATELET 101 405 - 296 K/uL    MPV 11.3 9.2 - 11.8 FL    NEUTROPHILS 67 40 - 73 %    LYMPHOCYTES 25 21 - 52 %    MONOCYTES 7 3 - 10 %    EOSINOPHILS 1 0 - 5 %    BASOPHILS 0 0 - 2 %    ABS. NEUTROPHILS 3.3 1.8 - 8.0 K/UL    ABS. LYMPHOCYTES 1.3 0.9 - 3.6 K/UL    ABS. MONOCYTES 0.3 0.05 - 1.2 K/UL    ABS. EOSINOPHILS 0.0 0.0 - 0.4 K/UL    ABS.  BASOPHILS 0.0 0.0 - 0.1 K/UL    DF AUTOMATED     PROTHROMBIN TIME + INR    Collection Time: 08/27/18  1:15 PM   Result Value Ref Range    Prothrombin time 12.8 11.5 - 15.2 sec    INR 1.0 0.8 - 1.2     METABOLIC PANEL, COMPREHENSIVE    Collection Time: 08/27/18  1:15 PM   Result Value Ref Range    Sodium 139 136 - 145 mmol/L    Potassium 5.0 3.5 - 5.5 mmol/L    Chloride 103 100 - 108 mmol/L    CO2 28 21 - 32 mmol/L    Anion gap 8 3.0 - 18 mmol/L    Glucose 92 74 - 99 mg/dL    BUN 15 7.0 - 18 MG/DL    Creatinine 0.76 0.6 - 1.3 MG/DL    BUN/Creatinine ratio 20 12 - 20      GFR est AA >60 >60 ml/min/1.73m2    GFR est non-AA >60 >60 ml/min/1.73m2    Calcium 9.4 8.5 - 10.1 MG/DL    Bilirubin, total 0.5 0.2 - 1.0 MG/DL    ALT (SGPT) 19 13 - 56 U/L    AST (SGOT) 43 (H) 15 - 37 U/L    Alk.  phosphatase 46 45 - 117 U/L    Protein, total 8.4 (H) 6.4 - 8.2 g/dL    Albumin 4.4 3.4 - 5.0 g/dL    Globulin 4.0 2.0 - 4.0 g/dL    A-G Ratio 1.1 0.8 - 1.7     MAGNESIUM    Collection Time: 08/27/18  1:15 PM   Result Value Ref Range    Magnesium 2.4 1.6 - 2.6 mg/dL   PTT    Collection Time: 08/27/18  1:15 PM   Result Value Ref Range    aPTT 27.5 23.0 - 36.4 SEC   CARDIAC PANEL,(CK, CKMB & TROPONIN)    Collection Time: 08/27/18  1:15 PM   Result Value Ref Range     (H) 26 - 192 U/L    CK - MB 3.4 <3.6 ng/ml    CK-MB Index 1.4 0.0 - 4.0 %    Troponin-I, Qt. <0.02 0.00 - 0.06 NG/ML   EKG, 12 LEAD, INITIAL    Collection Time: 08/27/18  2:54 PM   Result Value Ref Range    Ventricular Rate 54 BPM    Atrial Rate 54 BPM    P-R Interval 168 ms    QRS Duration 78 ms    Q-T Interval 466 ms    QTC Calculation (Bezet) 441 ms    Calculated P Axis 59 degrees    Calculated R Axis 57 degrees    Calculated T Axis 28 degrees    Diagnosis       Sinus bradycardia  Otherwise normal ECG  No previous ECGs available     URINALYSIS W/ RFLX MICROSCOPIC    Collection Time: 08/27/18  3:00 PM   Result Value Ref Range    Color YELLOW      Appearance CLEAR      Specific gravity 1.007 1.005 - 1.030      pH (UA) 5.5 5.0 - 8.0      Protein NEGATIVE  NEG mg/dL    Glucose NEGATIVE  NEG mg/dL    Ketone NEGATIVE  NEG mg/dL    Bilirubin NEGATIVE  NEG      Blood SMALL (A) NEG      Urobilinogen 0.2 0.2 - 1.0 EU/dL    Nitrites NEGATIVE  NEG      Leukocyte Esterase NEGATIVE  NEG     URINE MICROSCOPIC ONLY    Collection Time: 08/27/18  3:00 PM   Result Value Ref Range    WBC NEGATIVE  0 - 5 /hpf    RBC 0 to 1 0 - 5 /hpf    Epithelial cells FEW 0 - 5 /lpf    Bacteria NEGATIVE  NEG /hpf       Radiologic Studies -   MRI BRAIN W WO CONT   Final Result   Impression:    1. Acute lacunar infarct junction of right thalamus and posterior limb internal  capsule. 2. Small chronic infarcts superior right basal ganglia and right frontal deep  white matter. Mild bilateral deep white matter signal changes nonspecific,  likely chronic microvascular ischemic changes. 3. 13 mm enhancing probably extra-axial lesion with small dural base of  attachment along floor of left anterior cranial fossa with small adjacent brain  parenchymal mass effect and edema. This is partially calcified on CT, likely  representing meningioma. 4. Absent flow void distal left vertebral artery, suggestive either proximal  stenosis or occlusion, although could also be related to slow flow in a  developmentally small vessel. As read by the radiologist.     XR CHEST PORT   Final Result   IMPRESSION:    No acute cardiopulmonary disease. As read by the radiologist.     CT HEAD WO CONT   Final Result   IMPRESSION:      1. No acute intra-axial or extra-axial hemorrhage. No mass effect or midline  shift. 2. Focal area of low attenuation within the right basal ganglia, potentially  reflecting an area of age indeterminate infarction. 3. Possible meningioma versus osteoma arising from the left anterior cranial  fossa. Comparison with prior cross-sectional imaging is recommended if  available.  If no prior imaging is available, further, nonemergent  characterization can be performed with an MRI examination without and with  contrast.    4. Mild subcortical and periventricular white matter hypoattenuation; a  nonspecific finding as can be seen in the context of chronic ischemic  microvascular change. As read by the radiologist.     CTA HEAD NECK W WO CONT    (Results Pending)      CT Results  (Last 48 hours)               08/27/18 1254  CT HEAD WO CONT Final result    Impression:  IMPRESSION:           1. No acute intra-axial or extra-axial hemorrhage. No mass effect or midline   shift. 2. Focal area of low attenuation within the right basal ganglia, potentially   reflecting an area of age indeterminate infarction. 3. Possible meningioma versus osteoma arising from the left anterior cranial   fossa. Comparison with prior cross-sectional imaging is recommended if   available. If no prior imaging is available, further, nonemergent   characterization can be performed with an MRI examination without and with   contrast.       4. Mild subcortical and periventricular white matter hypoattenuation; a   nonspecific finding as can be seen in the context of chronic ischemic   microvascular change. Narrative:  EXAM: CT head       INDICATION: Gradual onset of left-sided weakness and slurred speech which is   progressing in severity. COMPARISON: None. TECHNIQUE: Axial CT imaging of the head was performed without intravenous   contrast.       One or more dose reduction techniques were used on this CT: automated exposure   control, adjustment of the mAs and/or kVp according to patient's size, and   iterative reconstruction techniques. The specific techniques utilized on this CT   exam have been documented in the patient's electronic medical record.        _______________       FINDINGS:       BRAIN AND POSTERIOR FOSSA: Mild cortical and cerebellar volume loss is noted,   which is within normal limits for patient age. Ventricular size and   configuration is within normal limits. Basilar cisterns are patent. Subcortical   and periventricular white matter hypoattenuation is noted. Additional more focal   area of low-attenuation is present with in the right basal (series 2, image 11). There is no intracranial hemorrhage, mass effect, or midline shift. The   gray-white matter differentiation is within normal limits. EXTRA-AXIAL SPACES AND MENINGES: Partially calcified lesion arising from the   inferior aspect of the anterior left cranial fossa noted, best appreciated on   coronal image numbers 9-10. No acute extra-axial fluid collection. CALVARIUM: No acute osseous abnormality. SINUSES: Imaged paranasal sinuses and mastoid air cells are clear. OTHER: Faint atherosclerotic calcifications of the carotid siphons are noted   bilaterally. _______________                 CXR Results  (Last 48 hours)               08/27/18 1346  XR CHEST PORT Final result    Impression:  IMPRESSION:       No acute cardiopulmonary disease. Narrative:  EXAM: Chest portable       INDICATION: Stroke       COMPARISON: None.       _______________       FINDINGS:       AP portable chest film was performed. Lungs are well expanded and clear. No   effusion or pneumothorax. Heart and pulmonary vascularity are normal.       _______________                   Medications given in the ED-  Medications   iopamidol (ISOVUE-370) 76 % injection 100 mL (not administered)   atorvastatin (LIPITOR) tablet 40 mg (not administered)   sodium chloride 0.9 % bolus infusion 1,000 mL (0 mL IntraVENous IV Completed 8/27/18 1451)   gadoterate meglumine (DOTAREM) 0.5 mmol/mL (376.9 mg/mL) contrast solution 10 mL (10 mL IntraVENous Given 8/27/18 1532)         Medical Decision Making   I am the first provider for this patient. I reviewed the vital signs, available nursing notes, past medical history, past surgical history, family history and social history. Vital Signs-Reviewed the patient's vital signs. Pulse Oximetry Analysis - 100% on RA     Cardiac Monitor:  Rate: 68 bpm  Rhythm: NSR.    EKG interpretation: (Preliminary)  Rhythm: Sinus bradycardia.  Rate: 54 bpm; QTc: 441 ms, No ST elevation  EKG read by Donnie García DO at 3:06 PM     Records Reviewed: Nursing Notes and Old Medical Records    Provider Notes (Medical Decision Making): 69 y.o female with Hx of CAD, medication noncompliance who presents with subacute symptoms of numbness, left-sided weakness and slurring of speech. On exam, she has mild dysarthria with facial asymmetry, ataxia on the left and LLE drift. NIH of 5. Will initiate a stroke work up including CT head and MRI. Will also evaluate for ACS. Will evaluate for infectious etiology. Pt will likely require neurology consult. Procedures:  Procedures    ED Course:   12:18 PM Initial assessment performed. The patients presenting problems have been discussed, and they are in agreement with the care plan formulated and outlined with them. I have encouraged them to ask questions as they arise throughout their visit. 12:21 PM The patient was counseled on the dangers of tobacco use, and was advised to quit. Reviewed strategies to maximize success, including removing cigarettes and smoking materials from environment, stress management, substitution of other forms of reinforcement and support of family/friends. 4:20 PM Updated pt on acute on chronic strokes and advised admission. Pt agrees with admission. 4:39 PM Discussed patient's history, exam, and available diagnostics results with Malka Hill MD, Neurology, who states he will see her tomorrow. Admit to telemetry through Bambi Rothman MD.    5:26 PM Discussed patient's history, exam, and available diagnostics results with Bambi Rothman MD, Hospitalist, to telemetry. Diagnosis and Disposition     Critical Care Time:   I have spent 40 minutes of critical care time involved in lab review, consultations with specialist, family decision-making, and documentation. During this entire length of time I was immediately available to the patient. Critical Care:   The reason for providing this level of medical care for this critically ill patient was due a critical illness that impaired one or more vital organ systems such that there was a high probability of imminent or life threatening deterioration in the patients condition. This care involved high complexity decision making to assess, manipulate, and support vital system functions, to treat this degreee vital organ system failure and to prevent further life threatening deterioration of the patients condition. Core Measures:  For Hospitalized Patients:    1. Hospitalization Decision Time:  The decision to hospitalize the patient was made by Lesia David MD at 4:39 PM on 8/27/2018    2. Aspirin: Aspirin was given    5:26 PM  Patient is being admitted to the hospital by Fernandez Vizcarra MD. The results of their tests and reasons for their admission have been discussed with them and/or available family. They convey agreement and understanding for the need to be admitted and for their admission diagnosis. CONDITIONS ON ADMISSION:  Sepsis is not present at the time of admission. Deep Vein Thrombosis is not present at the time of admission. Urinary Tract Infection is not present at the time of admission. Pneumonia is not present at the time of admission. MRSA is not present at the time of admission. Wound infection is not present at the time of admission. Pressure Ulcer is not present at the time of admission. CLINICAL IMPRESSION:    1. Cerebrovascular accident (CVA), unspecified mechanism ([address]    2. Secondary hypertension      _______________________________    Attestations: This note is prepared by [facility] , acting as Scribe for Suzy Pandya DO. Suzy Pandya DO:  The scribe's documentation has been prepared under my direction and personally reviewed by me in its entirety.   I confirm that the note above accurately reflects all work, treatment, procedures, and medical decision making performed by me.  _______________________________

## 2018-08-28 ENCOUNTER — APPOINTMENT (OUTPATIENT)
Dept: MRI IMAGING | Age: 68
DRG: 066 | End: 2018-08-28
Attending: HOSPITALIST
Payer: MEDICARE

## 2018-08-28 ENCOUNTER — APPOINTMENT (OUTPATIENT)
Dept: NON INVASIVE DIAGNOSTICS | Age: 68
DRG: 066 | End: 2018-08-28
Attending: PSYCHIATRY & NEUROLOGY
Payer: MEDICARE

## 2018-08-28 ENCOUNTER — HOME HEALTH ADMISSION (OUTPATIENT)
Dept: HOME HEALTH SERVICES | Facility: HOME HEALTH | Age: 68
End: 2018-08-28

## 2018-08-28 VITALS
WEIGHT: 97 LBS | OXYGEN SATURATION: 100 % | HEART RATE: 70 BPM | DIASTOLIC BLOOD PRESSURE: 68 MMHG | SYSTOLIC BLOOD PRESSURE: 188 MMHG | BODY MASS INDEX: 17.19 KG/M2 | HEIGHT: 63 IN | RESPIRATION RATE: 16 BRPM | TEMPERATURE: 99.2 F

## 2018-08-28 PROBLEM — I10 HTN (HYPERTENSION): Status: ACTIVE | Noted: 2018-08-28

## 2018-08-28 LAB
ANION GAP SERPL CALC-SCNC: 10 MMOL/L (ref 3–18)
BUN SERPL-MCNC: 12 MG/DL (ref 7–18)
BUN/CREAT SERPL: 17 (ref 12–20)
CALCIUM SERPL-MCNC: 8.3 MG/DL (ref 8.5–10.1)
CHLORIDE SERPL-SCNC: 107 MMOL/L (ref 100–108)
CHOLEST SERPL-MCNC: 177 MG/DL
CO2 SERPL-SCNC: 25 MMOL/L (ref 21–32)
CREAT SERPL-MCNC: 0.7 MG/DL (ref 0.6–1.3)
ECHO AO ARCH DIAM: 2.2 CM
ECHO AO ASC DIAM: 3.01 CM
ECHO AO ROOT DIAM: 3 CM
ECHO AV AREA PEAK VELOCITY: 2.5 CM2
ECHO AV AREA VTI: 2.5 CM2
ECHO AV AREA/BSA PEAK VELOCITY: 1.8 CM2/M2
ECHO AV AREA/BSA VTI: 1.8 CM2/M2
ECHO AV MEAN GRADIENT: 3.1 MMHG
ECHO AV PEAK GRADIENT: 6.2 MMHG
ECHO AV PEAK VELOCITY: 124.43 CM/S
ECHO AV VTI: 27.86 CM
ECHO IVC PROX: 1.7 CM
ECHO LA MAJOR AXIS: 1.83 CM
ECHO LA VOL 2C: 17.87 ML (ref 22–52)
ECHO LA VOL 4C: 12.94 ML (ref 22–52)
ECHO LA VOL BP: 18.26 ML (ref 22–52)
ECHO LA VOL/BSA BIPLANE: 12.84 ML/M2
ECHO LA VOLUME INDEX A2C: 12.57 ML/M2
ECHO LA VOLUME INDEX A4C: 9.1 ML/M2
ECHO LV E' LATERAL VELOCITY: 0.7 CM/S
ECHO LV E' SEPTAL VELOCITY: 0.5 CM/S
ECHO LV EDV A2C: 58.6 ML
ECHO LV EDV A4C: 51.2 ML
ECHO LV EDV BP: 55.3 ML (ref 56–104)
ECHO LV EDV INDEX A4C: 36 ML/M2
ECHO LV EDV INDEX BP: 38.9 ML/M2
ECHO LV EDV NDEX A2C: 41.2 ML/M2
ECHO LV EJECTION FRACTION A2C: 77 %
ECHO LV EJECTION FRACTION A4C: 74 %
ECHO LV EJECTION FRACTION BIPLANE: 75.6 % (ref 55–100)
ECHO LV ESV A2C: 13.5 ML
ECHO LV ESV A4C: 13.4 ML
ECHO LV ESV BP: 13.5 ML (ref 19–49)
ECHO LV ESV INDEX A2C: 9.5 ML/M2
ECHO LV ESV INDEX A4C: 9.4 ML/M2
ECHO LV ESV INDEX BP: 9.5 ML/M2
ECHO LV INTERNAL DIMENSION DIASTOLIC: 3.51 CM (ref 3.9–5.3)
ECHO LV INTERNAL DIMENSION SYSTOLIC: 2.36 CM
ECHO LV IVSD: 1.2 CM (ref 0.6–0.9)
ECHO LV MASS 2D: 139.1 G (ref 67–162)
ECHO LV MASS INDEX 2D: 97.8 G/M2
ECHO LV POSTERIOR WALL DIASTOLIC: 1.04 CM (ref 0.6–0.9)
ECHO LVOT DIAM: 1.98 CM
ECHO LVOT PEAK GRADIENT: 4 MMHG
ECHO LVOT PEAK VELOCITY: 100.2 CM/S
ECHO LVOT VTI: 22.86 CM
ECHO MV A VELOCITY: 98.24 CM/S
ECHO MV AREA PHT: 2.4 CM2
ECHO MV E DECELERATION TIME (DT): 322.2 MS
ECHO MV E VELOCITY: 0.88 CM/S
ECHO MV E/A RATIO: 0.01
ECHO MV E/E' LATERAL: 1.26
ECHO MV E/E' RATIO (AVERAGED): 1.51
ECHO MV E/E' SEPTAL: 1.76
ECHO MV PRESSURE HALF TIME (PHT): 93.4 MS
ECHO RA AREA 4C: 7.31 CM2
ECHO RV INTERNAL DIMENSION: 2.48 CM
ECHO RV TAPSE: 1.7 CM (ref 1.5–2)
ECHO TV REGURGITANT MAX VELOCITY: 245.91 CM/S
ECHO TV REGURGITANT PEAK GRADIENT: 24.2 MMHG
ERYTHROCYTE [DISTWIDTH] IN BLOOD BY AUTOMATED COUNT: 14.8 % (ref 11.6–14.5)
GLUCOSE SERPL-MCNC: 87 MG/DL (ref 74–99)
HCT VFR BLD AUTO: 38.9 % (ref 35–45)
HDLC SERPL-MCNC: 50 MG/DL (ref 40–60)
HDLC SERPL: 3.5 {RATIO} (ref 0–5)
HGB BLD-MCNC: 12.5 G/DL (ref 12–16)
LDLC SERPL CALC-MCNC: 81.8 MG/DL (ref 0–100)
LIPID PROFILE,FLP: ABNORMAL
MCH RBC QN AUTO: 30 PG (ref 24–34)
MCHC RBC AUTO-ENTMCNC: 32.1 G/DL (ref 31–37)
MCV RBC AUTO: 93.3 FL (ref 74–97)
PLATELET # BLD AUTO: 147 K/UL (ref 135–420)
PMV BLD AUTO: 10.9 FL (ref 9.2–11.8)
POTASSIUM SERPL-SCNC: 4.1 MMOL/L (ref 3.5–5.5)
RBC # BLD AUTO: 4.17 M/UL (ref 4.2–5.3)
SODIUM SERPL-SCNC: 142 MMOL/L (ref 136–145)
TRIGL SERPL-MCNC: 226 MG/DL (ref ?–150)
VLDLC SERPL CALC-MCNC: 45.2 MG/DL
WBC # BLD AUTO: 5.2 K/UL (ref 4.6–13.2)

## 2018-08-28 PROCEDURE — 36415 COLL VENOUS BLD VENIPUNCTURE: CPT | Performed by: HOSPITALIST

## 2018-08-28 PROCEDURE — 74011250636 HC RX REV CODE- 250/636: Performed by: HOSPITALIST

## 2018-08-28 PROCEDURE — 96374 THER/PROPH/DIAG INJ IV PUSH: CPT

## 2018-08-28 PROCEDURE — 74011250637 HC RX REV CODE- 250/637: Performed by: PSYCHIATRY & NEUROLOGY

## 2018-08-28 PROCEDURE — 74011250637 HC RX REV CODE- 250/637: Performed by: HOSPITALIST

## 2018-08-28 PROCEDURE — 70544 MR ANGIOGRAPHY HEAD W/O DYE: CPT

## 2018-08-28 PROCEDURE — G8989 SELF CARE D/C STATUS: HCPCS

## 2018-08-28 PROCEDURE — G8987 SELF CARE CURRENT STATUS: HCPCS

## 2018-08-28 PROCEDURE — 70547 MR ANGIOGRAPHY NECK W/O DYE: CPT

## 2018-08-28 PROCEDURE — 97166 OT EVAL MOD COMPLEX 45 MIN: CPT

## 2018-08-28 PROCEDURE — G8988 SELF CARE GOAL STATUS: HCPCS

## 2018-08-28 PROCEDURE — G8978 MOBILITY CURRENT STATUS: HCPCS

## 2018-08-28 PROCEDURE — 80048 BASIC METABOLIC PNL TOTAL CA: CPT | Performed by: HOSPITALIST

## 2018-08-28 PROCEDURE — 97530 THERAPEUTIC ACTIVITIES: CPT

## 2018-08-28 PROCEDURE — 80061 LIPID PANEL: CPT | Performed by: HOSPITALIST

## 2018-08-28 PROCEDURE — 97163 PT EVAL HIGH COMPLEX 45 MIN: CPT

## 2018-08-28 PROCEDURE — G8979 MOBILITY GOAL STATUS: HCPCS

## 2018-08-28 PROCEDURE — 74011000250 HC RX REV CODE- 250: Performed by: HOSPITALIST

## 2018-08-28 PROCEDURE — 85027 COMPLETE CBC AUTOMATED: CPT | Performed by: HOSPITALIST

## 2018-08-28 PROCEDURE — 74011000250 HC RX REV CODE- 250

## 2018-08-28 RX ORDER — SODIUM CHLORIDE 0.9 % (FLUSH) 0.9 %
15 SYRINGE (ML) INJECTION ONCE
Status: COMPLETED | OUTPATIENT
Start: 2018-08-28 | End: 2018-08-28

## 2018-08-28 RX ORDER — ASPIRIN 81 MG/1
81 TABLET ORAL DAILY
Qty: 30 TAB | Refills: 2 | Status: SHIPPED | OUTPATIENT
Start: 2018-08-29

## 2018-08-28 RX ORDER — CLOPIDOGREL BISULFATE 75 MG/1
75 TABLET ORAL DAILY
Status: DISCONTINUED | OUTPATIENT
Start: 2018-08-29 | End: 2018-08-28 | Stop reason: HOSPADM

## 2018-08-28 RX ORDER — LISINOPRIL 20 MG/1
20 TABLET ORAL DAILY
Qty: 30 TAB | Refills: 2 | Status: SHIPPED | OUTPATIENT
Start: 2018-08-28 | End: 2018-12-20

## 2018-08-28 RX ORDER — ASPIRIN 81 MG/1
81 TABLET ORAL DAILY
Status: DISCONTINUED | OUTPATIENT
Start: 2018-08-28 | End: 2018-08-28 | Stop reason: HOSPADM

## 2018-08-28 RX ORDER — LISINOPRIL 20 MG/1
20 TABLET ORAL DAILY
Status: DISCONTINUED | OUTPATIENT
Start: 2018-08-28 | End: 2018-08-28 | Stop reason: HOSPADM

## 2018-08-28 RX ORDER — CLOPIDOGREL 300 MG/1
300 TABLET, FILM COATED ORAL ONCE
Status: COMPLETED | OUTPATIENT
Start: 2018-08-28 | End: 2018-08-28

## 2018-08-28 RX ORDER — HYDROCHLOROTHIAZIDE 25 MG/1
25 TABLET ORAL DAILY
Qty: 30 TAB | Refills: 2 | Status: SHIPPED | OUTPATIENT
Start: 2018-08-28 | End: 2018-12-20

## 2018-08-28 RX ORDER — CLOPIDOGREL BISULFATE 75 MG/1
75 TABLET ORAL DAILY
Qty: 30 TAB | Refills: 2 | Status: SHIPPED | OUTPATIENT
Start: 2018-08-29 | End: 2018-12-20

## 2018-08-28 RX ORDER — HYDROCHLOROTHIAZIDE 25 MG/1
25 TABLET ORAL DAILY
Status: DISCONTINUED | OUTPATIENT
Start: 2018-08-28 | End: 2018-08-28 | Stop reason: HOSPADM

## 2018-08-28 RX ORDER — ATORVASTATIN CALCIUM 40 MG/1
40 TABLET, FILM COATED ORAL DAILY
Qty: 30 TAB | Refills: 2 | Status: SHIPPED | OUTPATIENT
Start: 2018-08-29 | End: 2018-12-20

## 2018-08-28 RX ORDER — SODIUM CHLORIDE 9 MG/ML
INJECTION INTRAMUSCULAR; INTRAVENOUS; SUBCUTANEOUS
Status: COMPLETED
Start: 2018-08-28 | End: 2018-08-28

## 2018-08-28 RX ADMIN — SODIUM CHLORIDE 15 ML: 9 INJECTION, SOLUTION INTRAMUSCULAR; INTRAVENOUS; SUBCUTANEOUS at 11:41

## 2018-08-28 RX ADMIN — CLOPIDOGREL BISULFATE 300 MG: 300 TABLET, FILM COATED ORAL at 08:39

## 2018-08-28 RX ADMIN — Medication 15 ML: at 16:45

## 2018-08-28 RX ADMIN — ALPRAZOLAM 1 MG: 0.5 TABLET ORAL at 07:03

## 2018-08-28 RX ADMIN — SODIUM CHLORIDE 100 ML/HR: 900 INJECTION, SOLUTION INTRAVENOUS at 06:53

## 2018-08-28 RX ADMIN — METOPROLOL TARTRATE 5 MG: 5 INJECTION, SOLUTION INTRAVENOUS at 06:23

## 2018-08-28 RX ADMIN — ATORVASTATIN CALCIUM 40 MG: 20 TABLET, FILM COATED ORAL at 08:38

## 2018-08-28 RX ADMIN — ALPRAZOLAM 1 MG: 0.5 TABLET ORAL at 16:44

## 2018-08-28 RX ADMIN — ASPIRIN 81 MG: 81 TABLET, COATED ORAL at 08:38

## 2018-08-28 NOTE — PROGRESS NOTES
Problem: Self Care Deficits Care Plan (Adult) Goal: *Acute Goals and Plan of Care (Insert Text) Outcome: Resolved/Met Date Met: 08/28/18 Occupational Therapy EVALUATION/discharge Patient: Vanessa Bryant (73 y.o. female) Date: 8/28/2018 [facility] Precautions:  Fall (Stroke) ASSESSMENT AND RECOMMENDATIONS: 
Based on the objective data described below, the patient presents with ability to perform ADL tasks at baseline level. Patient eager to be discharged home. Pt w/ R eye deviation that initially pt stating no h/o vision issues, except awaiting new glasses. Pt w/ convergence, but grossly functional. Pt w/ mild L sided weakness, but reports resolution of symptoms. Able to open containers and /pinch socks w/o difficulty. Instructed on s/s of CVA w/ Handout provided (sister present). Pt with no further OT/ADL concerns at this time. Education: Reviewed home safety, body mechanics, signs and symptoms of a stroke, risk factors, smoking in relation to stroke, and adaptive dressing techniques with patient verbalizing understanding at this time. Stroke Education: Patient educated on signs/symptoms of stroke and importance of early intervention. Patient verbalized understanding. Discharge Recommendations: Home Health Further Equipment Recommendations for Discharge: N/A   
 
SUBJECTIVE:  
Patient stated I'm ready to go.  OBJECTIVE DATA SUMMARY:  
 
Past Medical History:  
Diagnosis Date  CAD (coronary artery disease)  Hypertension Past Surgical History:  
Procedure Laterality Date  HX HYSTERECTOMY  HX REFRACTIVE SURGERY Barriers to Learning/Limitations: yes;  sensory deficits-vision/hearing/speech Compensate with: visual, verbal, tactile, kinesthetic cues/model Prior Level of Function/Home Situation: supportive sister; brother just had 2 strokes last week (taken off Coumadin for colonoscopy) Home Situation Home Environment: Private residence # Steps to Enter: 4 Rails to Enter: Yes Hand Rails : Bilateral 
Wheelchair Ramp: No 
One/Two Story Residence: One story Living Alone: No 
Support Systems: Family member(s) (brother & sister) Patient Expects to be Discharged to[de-identified] Private residence Current DME Used/Available at Home: None Tub or Shower Type: Tub/Shower combination 
[x]     Right hand dominant   []     Left hand dominant Cognitive/Behavioral Status: 
Neurologic State: Alert Orientation Level: Oriented X4 Cognition: Decreased attention/concentration; Follows commands; Impaired decision making (baseline) Safety/Judgement: Awareness of environment;Decreased insight into deficits Skin: appears intact Edema: no significant edema noted Vision/Perceptual:   
 difficulty w/ tracking d/t R eye baseline deficits No convergence Acuity [facility] Coordination: 
Coordination: Within functional limits Fine Motor Skills-Upper: Left Intact; Right Intact Gross Motor Skills-Upper: Left Intact; Right Intact Balance: 
Sitting: Intact Standing: Intact Strength: 
Strength: Generally decreased, functional 
Tone & Sensation: 
Tone: Normal 
Sensation: Intact Range of Motion: 
AROM: Generally decreased, functional 
PROM: Generally decreased, functional 
 
Functional Mobility and Transfers for ADLs: 
Bed Mobility: 
Supine to Sit: Supervision;Modified independent Sit to Supine: Supervision;Modified independent Scooting: Supervision;Modified independent Transfers: 
Sit to Stand: Supervision;Modified independent Bed to Chair: Supervision Toilet Transfer : Supervision ADL Assessment: 
Feeding: Setup Oral Facial Hygiene/Grooming: Supervision Bathing: Setup Upper Body Dressing: Setup Lower Body Dressing: Setup Toileting: Supervision;Modified independent ADL Intervention: 
Grooming Washing Hands: Supervision/set-up; Modified independent Lower Body Dressing Assistance Socks: Supervision/set-up (ankle-over-knee) Position Performed: Seated edge of bed Cognitive Retraining Problem Solving: Inductive reason; Identifying the task; Identifying the problem;General alternative solution;Deductive reason Executive Functions: Executing cognitive plans Organizing/Sequencing: Prioritizing;Breaking task down Following Commands: Awareness of environment; Follows one step commands/directions Safety/Judgement: Awareness of environment;Decreased insight into deficits Cues: Tactile cues provided;Verbal cues provided;Visual cues provided Pain: 
Pre-treatment: 0/10 Post-treatment: 0/10 Activity Tolerance:  
Patient able to stand 2-3 minute(s). Patient able to complete ADLs with intermittent rest breaks. Patient limited by balance w/ LOB to L side during dynamic balance activities w/ PT. Patient unsteady Please refer to the flowsheet for vital signs taken during this treatment. After treatment:  
[]  Patient left in no apparent distress sitting up in chair 
[x]  Patient left in no apparent distress in bed/sitting EOB [x]  Call bell left within reach [x]  Nursing notified 
[x]  Caregiver present/sister 
[]  Bed alarm activated COMMUNICATION/EDUCATION:  
Communication/Collaboration: 
[x]      Home safety education was provided and the patient/caregiver indicated understanding. [x]      Patient/family have participated as able and agree with findings and recommendations. []      Patient is unable to participate in plan of care at this time. Thank you for this referral. 
Chelsey Pike, OTR/L Time Calculation: 18 mins G-Codes (GP) Self Care  Current  CI= 1-19%  Goal  CI= 1-19%  D/C  CI= 1-19% The severity rating is based on the professional judgement & direct observation of Level of Assistance required for Functional Mobility and ADLs.  
 
Eval Complexity: History: HIGH Complexity : Extensive review of history including physical, cognitive and psychosocial history ; Examination: MEDIUM Complexity : 3-5 performance deficits relating to physical, cognitive , or psychosocial skils that result in activity limitations and / or participation restrictions; Decision Making:MEDIUM Complexity : Patient may present with comorbidities that affect occupational performnce. Miniml to moderate modification of tasks or assistance (eg, physical or verbal ) with assesment(s) is necessary to enable patient to complete evaluation

## 2018-08-28 NOTE — PROGRESS NOTES
[facility], RN 3 [address]  patient has no desire to be stuck anymore, stuck several times. Veins not patent.  Will inform ordering physician--[id number]//sj

## 2018-08-28 NOTE — PROGRESS NOTES
2020: Received report from [address],[place] [address] from the ED. White board updated. Pt is alert and oriented x4. Pt currently does not report any pain or respiratory distress. NIH Score 3. Pt's questions and concerns addressed at this time. Will continue to monitor. 2200: Dr. Nguyen Diaz notified about pt's high blood pressure and anxiety, orders for PRN Xanax, nicotine patch and Lopressor given . 2216: PRN Lopressor given, blood pressure decreased slightly from 201/70 to 193/74, blood pressure gradually decreasing thereafter. 2244: PRN Xanax given, pt states that she feels calmer and less anxious. 0000: Reassessment completed, no changes compared to initial assessment, pt does not report any pain or discomfort at this time. Bed in lowest position, and call bell within reach. Will continue to monitor. 3053: Pt no longer reporting \"tingling or numbness\" of left sided extremities. 0400: Pt resting comfortably, will continue to round hourly on pt.    0600: Dr. Nguyen Diaz notified about several failed attempts to gain 20G for CTA of head and neck. No new orders given at this time. 6379: PRN Lopressor given for BP of 200/79.  BP decreased to 196/72.    0703: PRN Xanax given for anxiety

## 2018-08-28 NOTE — PROGRESS NOTES
Problem: Falls - Risk of  Goal: *Absence of Falls  Document Alcon Fall Risk and appropriate interventions in the flowsheet.    Outcome: Progressing Towards Goal  Fall Risk Interventions:            Medication Interventions: Patient to call before getting OOB, Teach patient to arise slowly

## 2018-08-28 NOTE — CONSULTS
NEUROLOGY CONSULTATION NOTE    Patient: Urmila Zhao MRN: [id number]  CSN: [id number]    YOB: 1950  Age: 69 y.o. Sex: female    DOA: 8/27/2018 LOS:  LOS: 1 day        Requesting Physician: Dr. Robert Sofia  Reason for Consultation: Stroke    HISTORY OF PRESENT ILLNESS:   Urmila Zhao is a 29-year-old female with history of hypertension, CAD and long history of tobacco use, who presented with left sided weakness/numbness. She started to have symptoms approximately two weeks ago with numbness/tingling feeling on her left leg and then started to have symptoms on the left arm. She recognized some weakness with left hand and also with gait. She didnt seek medical attention immediately since her brother had a stroke, currently on [place]. She also recognized some slurred speech during that time. Currently, the symptoms have been stable. She presented to primary care office yesterday, who sent the patient to ER. She underwent a brain MRI, which showed an acute lacunar infarction on right thalamus and posterior limb of internal capsule. She has been taking aspirin 81 mg daily since her angioplasty for several years. She was given a loading dose of aspirin on admission yesterday. She doesn't take any statins. Stroke Work-up:  Brain MRI: 1. Acute lacunar infarct junction of right thalamus and posterior limb internal capsule. 2. Small chronic infarcts superior right basal ganglia and right frontal deep white matter. Mild bilateral deep white matter signal changes nonspecific, likely chronic microvascular ischemic changes. 3. 13 mm enhancing probably extra-axial lesion with small dural base of attachment along floor of left anterior cranial fossa with small adjacent brain parenchymal mass effect and edema. This is partially calcified on CT, likely representing meningioma.  4. Absent flow void distal left vertebral artery, suggestive either proximal stenosis or occlusion, although could also be related to slow flow in a developmentally small vessel. CT Head: 1. No acute intra-axial or extra-axial hemorrhage. No mass effect or midline shift. 2. Focal area of low attenuation within the right basal ganglia, potentially reflecting an area of age indeterminate infarction. 3. Possible meningioma versus osteoma arising from the left anterior cranial fossa. Comparison with prior cross-sectional imaging is recommended if available. If no prior imaging is available, further, nonemergent characterization can be performed with an MRI examination without and with contrast. 4. Mild subcortical and periventricular white matter hypoattenuation; a nonspecific finding as can be seen in the context of chronic ischemic microvascular change. CTA of head and neck:  Echocardiogram:   Lipid panel:   Lab Results   Component Value Date/Time    Cholesterol, total 177 08/28/2018 03:56 AM    HDL Cholesterol 50 08/28/2018 03:56 AM    LDL, calculated 81.8 08/28/2018 03:56 AM    VLDL, calculated 45.2 08/28/2018 03:56 AM    Triglyceride 226 (H) 08/28/2018 03:56 AM    CHOL/HDL Ratio 3.5 08/28/2018 03:56 AM     HbA1c:   Lab Results   Component Value Date/Time    Hemoglobin A1c 5.1 08/27/2018 01:15 PM       PAST MEDICAL HISTORY:  Past Medical History:   Diagnosis Date    CAD (coronary artery disease)     Hypertension      PAST SURGICAL HISTORY:  Past Surgical History:   Procedure Laterality Date    HX HYSTERECTOMY      HX REFRACTIVE SURGERY       FAMILY HISTORY:  History reviewed. No pertinent family history. SOCIAL HISTORY:  Social History     Social History    Marital status: UNKNOWN     Spouse name: N/A    Number of children: N/A    Years of education: N/A     Social History Main Topics    Smoking status: Current Some Day Smoker     Packs/day: 1.00     Types: Cigarettes    Smokeless tobacco: Never Used    Alcohol use 0.0 oz/week     0 Standard drinks or equivalent per week      Comment: occasionally    Drug use:  Yes Special: Marijuana    Sexual activity: Not Asked     Other Topics Concern    None     Social History Narrative     MEDICATIONS:  Current Facility-Administered Medications   Medication Dose Route Frequency    atorvastatin (LIPITOR) tablet 40 mg  40 mg Oral DAILY    aspirin (ASPIRIN) tablet 325 mg  325 mg Oral DAILY    0.9% sodium chloride infusion  100 mL/hr IntraVENous CONTINUOUS    enoxaparin (LOVENOX) injection 40 mg  40 mg SubCUTAneous Q24H    metoprolol (LOPRESSOR) injection 5 mg  5 mg IntraVENous Q6H PRN    nicotine (NICODERM CQ) 14 mg/24 hr patch 1 Patch  1 Patch TransDERmal DAILY    ALPRAZolam (XANAX) tablet 1 mg  1 mg Oral TID PRN     Prior to Admission medications    Medication Sig Start Date End Date Taking? Authorizing Provider   azithromycin (AZASITE) 1 % ophthalmic solution Administer 1 Drop to both eyes two (2) times a day. Yes Phys Other, MD   aspirin (ASPIRIN) 325 mg tablet Take 325 mg by mouth daily. Yes Historical Provider   alprazolam Marrion Jeans) 2 mg tablet Take  by mouth. Yes Historical Provider       ALLERGIES:  Allergies   Allergen Reactions    Ampicillin Other (comments)    Codeine Other (comments)    Pcn [Penicillins] Other (comments)    Tetracyclines Other (comments)       Review of Systems  GENERAL: No fevers or chills. HEENT: No change in vision, earache, tinnitus, sore throat or sinus congestion. NECK: No pain or stiffness. CARDIOVASCULAR: No chest pain or pressure. No palpitations. PULMONARY: No shortness of breath, cough or wheeze. GASTROINTESTINAL: No abdominal pain, nausea, vomiting or diarrhea. GENITOURINARY: No urinary frequency, urgency, hesitancy or dysuria. MUSCULOSKELETAL: No joint or muscle pain, no back pain, no recent trauma. DERMATOLOGIC: No rash, no itching, no lesions. ENDOCRINE: No polyuria, polydipsia, no heat or cold intolerance. HEMATOLOGICAL: No anemia or easy bruising or bleeding. NEUROLOGIC: No headache, seizures.  See HPI    PHYSICAL EXAMINATION:     Visit Vitals    /72    Pulse 62    Temp 98.7 °F (37.1 °C)    Resp 16    Ht 5' 3\" (1.6 m)    Wt 44 kg (97 lb)    SpO2 100%    BMI 17.18 kg/m2      O2 Device: Room air  GENERAL: Pleasant, in no apparent distress. HEENT: Moist mucous membranes, sclerae anicteric, scalp is atraumatic. CVS: Regular rate and rhythm, no murmurs or gallops. No carotid bruits. PULMONARY: Clear to auscultation bilaterally. No rales or rhonchi. No wheezing. EXTREMITIES: Normal range of motion at all sites. No deformities. ABDOMEN: Soft, nontender. SKIN: No rashes or ecchymoses. Warm and dry. NEUROLOGIC: Alert and oriented x3. Speech is fluent without any aphasia but minimal dysarthria. Cranial nerves: Face is symmetric with symmetric smile. Facial sensation is intact. Extraocular movements: eyes exotropic. Right eye adduction is limited after midline (not new). Visual fields are full to confrontation. PERRL. Tongue is midline. Palate elevates symmetrically. Hearing intact to speech. Sternocleidomastoid and trapezius strengths are full bilaterally. Motor: Left arm drift with orbiting. Left leg slightly weak. Right side full. Sensory: Intact to pinprick and touch on all four. Coordination: Intact coordination with finger-nose-finger bilaterally. Normal fine movements. No bradykinesia detected. Deep tendon reflexes: 2+ at biceps, brachioradialis, patella and ankles bilaterally. Toes are down-going bilaterally.     Labs: Results:       Chemistry Recent Labs      08/28/18   0356  08/27/18   1315   GLU  87  92   NA  142  139   K  4.1  5.0   CL  107  103   CO2  25  28   BUN  12  15   CREA  0.70  0.76   CA  8.3*  9.4   AGAP  10  8   BUCR  17  20   AP   --   46   TP   --   8.4*   ALB   --   4.4   GLOB   --   4.0   AGRAT   --   1.1      CBC w/Diff Recent Labs      08/28/18   0356  08/27/18   1315   WBC  5.2  5.0   RBC  4.17*  4.33   HGB  12.5  13.2   HCT  38.9  40.0   PLT  147  159   GRANS --   67   LYMPH   --   25   EOS   --   1      Cardiac Enzymes Recent Labs      08/27/18   1315   CPK  240*   CKND1  1.4      Coagulation Recent Labs      08/27/18   1315   PTP  12.8   INR  1.0   APTT  27.5       Lipid Panel Lab Results   Component Value Date/Time    Cholesterol, total 177 08/28/2018 03:56 AM    HDL Cholesterol 50 08/28/2018 03:56 AM    LDL, calculated 81.8 08/28/2018 03:56 AM    VLDL, calculated 45.2 08/28/2018 03:56 AM    Triglyceride 226 (H) 08/28/2018 03:56 AM    CHOL/HDL Ratio 3.5 08/28/2018 03:56 AM      BNP No results for input(s): BNPP in the last 72 hours. Liver Enzymes Recent Labs      08/27/18   1315   TP  8.4*   ALB  4.4   AP  46   SGOT  43*      Thyroid Studies No results found for: T4, T3U, TSH, TSHEXT       Radiology:  Pr[facility] Shed Wo Cont    Result Date: 8/27/2018  CLINICAL INDICATION/HISTORY:Worsening slurred speech and numbness and tingling COMPARISON: Correlation CT head same day TECHNIQUE: Sagittal spin echo T1, axial spin echo T1, axial JENNIFER T2, FLAIR, T2 gradient and diffusion sequences, and axial and coronal T1 postgadolinium sequences of the brain were obtained. FINDINGS:  Diffusion:  Small band of restricted diffusion signal along the junction of the lateral right thalamus and posterior limb internal capsule. No other restricted diffusion signal. Brain parenchyma:  Mild low T1 and increased T2 and FLAIR signal associated with acute infarct right thalamic internal capsule junction. Small chronic infarcts superior right lentiform nucleus and right superior frontal deep white matter. Additional mild confluent and multifocal increased T2 and FLAIR signal periventricular and deep cerebral white matter in a nonspecific pattern. Mild FLAIR hyperintensity adjacent to polypoid dural based enhancing lesion inferior left frontal lobe. Ventricles and sulci:  Normal.  No significant atrophy given age.  Extra-axial:  There is a oval-shaped slightly lobulated densely enhancing lesion along the inferior left frontal lobe which has slightly low T1 and mildly low T2 signal. This has small area of contiguity with adjacent dura along the floor of the left anterior cranial fossa, extending medially. This probably is extra-axial in location with some adjacent deformity of the left frontal cortex with mild adjacent parenchymal edema. This measures maximally 13 x 7 x 7 mm in greatest transverse, cephalocaudad, and AP dimensions. No other abnormal extra-axial fluid collection or mass. Brain vasculature:  Absent flow void left intradural vertebral artery. Incidental likely persistent left trigeminal artery, normal variant. Craniocervical junction:  Normal. Skull base, extracranial and calvarium:  Orbits, paranasal sinuses, IACs unremarkable. Mild increased T2 signal inferior left mastoid. Partial empty sella. No skull abnormality. Contrast:There is no other abnormal enhancement. Impression: 1. Acute lacunar infarct junction of right thalamus and posterior limb internal capsule. 2. Small chronic infarcts superior right basal ganglia and right frontal deep white matter. Mild bilateral deep white matter signal changes nonspecific, likely chronic microvascular ischemic changes. 3. 13 mm enhancing probably extra-axial lesion with small dural base of attachment along floor of left anterior cranial fossa with small adjacent brain parenchymal mass effect and edema. This is partially calcified on CT, likely representing meningioma. 4. Absent flow void distal left vertebral artery, suggestive either proximal stenosis or occlusion, although could also be related to slow flow in a developmentally small vessel. Ct Head Wo Cont    Result Date: 8/27/2018  EXAM: CT head INDICATION: Gradual onset of left-sided weakness and slurred speech which is progressing in severity. COMPARISON: None.  TECHNIQUE: Axial CT imaging of the head was performed without intravenous contrast. One or more dose reduction techniques were used on this CT: automated exposure control, adjustment of the mAs and/or kVp according to patient's size, and iterative reconstruction techniques. The specific techniques utilized on this CT exam have been documented in the patient's electronic medical record. _______________ FINDINGS: BRAIN AND POSTERIOR FOSSA: Mild cortical and cerebellar volume loss is noted, which is within normal limits for patient age. Ventricular size and configuration is within normal limits. Basilar cisterns are patent. Subcortical and periventricular white matter hypoattenuation is noted. Additional more focal area of low-attenuation is present with in the right basal (series 2, image 11). There is no intracranial hemorrhage, mass effect, or midline shift. The gray-white matter differentiation is within normal limits. EXTRA-AXIAL SPACES AND MENINGES: Partially calcified lesion arising from the inferior aspect of the anterior left cranial fossa noted, best appreciated on coronal image numbers [id number]. No acute extra-axial fluid collection. CALVARIUM: No acute osseous abnormality. SINUSES: Imaged paranasal sinuses and mastoid air cells are clear. OTHER: Faint atherosclerotic calcifications of the carotid siphons are noted bilaterally. _______________     IMPRESSION: 1. No acute intra-axial or extra-axial hemorrhage. No mass effect or midline shift. 2. Focal area of low attenuation within the right basal ganglia, potentially reflecting an area of age indeterminate infarction. 3. Possible meningioma versus osteoma arising from the left anterior cranial fossa. Comparison with prior cross-sectional imaging is recommended if available.  If no prior imaging is available, further, nonemergent characterization can be performed with an MRI examination without and with contrast. 4. Mild subcortical and periventricular white matter hypoattenuation; a nonspecific finding as can be seen in the context of chronic ischemic microvascular change. ASSESSMENT/IMPRESSION:  Acute lacunar infarction involving the posterior limb of internal capsule and junction of thalamus on the right side, seemingly secondary to small vessel disease. I asked the patient to discontinue smoking. Well need to complete the stroke workup with echocardiogram and CTA. The patient would be regarded as aspirin failure since she had the stroke while she was on aspirin. Therefore, I would like to start the patient clopidogrel. Depending on the CTA, the patient may need to be on dual antiplatelet regimen. For now, I would continue aspirin and Plavix together. RECOMMENDATIONS:  1. CTA of head and neck  2. Normotensive protocol. The stroke is beyond the acute window. 3. Continue aspirin 81 mg daily for now. 4. Will start clopidogrel 75 mg daily with a loading dose today. 5. Echocardiogram with bubble study. 6. Oral liquids for hydration  7. continue atorvastatin 40 mg daily for now. 8. PT/OT and speech therapy, disposition planning    I will follow the patient.  Please do not hesitate to return with any questions.    ------------------------------------  Eliana Mora MD  8/28/2018  7:46 AM

## 2018-08-28 NOTE — ROUTINE PROCESS
Shift summary: 
1051: Pt transferred off unit via wheelchair with transporter. 1151: Pt back on unit via wheelchair with transporter 1300: Discussed with Dr. Bro Rodriguez the concern about patient not being able to to get CT completed because unable to obtain a 20G in pts AC. Dr. Bro Rodriguez stated that he will give Dr. Lily Pemberton a text about switching CT to [facility]. Dual AVS reviewed with Juan José Alicia RN. All medications reviewed individually with patient. Opportunities for questions and concerns provided. Patient discharged via (mode of transport ie. Car, ambulance or air transport) wheelchair Patient's arm band appropriately discarded.

## 2018-08-28 NOTE — ROUTINE PROCESS
0700: Bedside and Verbal shift change report given to Joo Hayes  (oncoming nurse) by Chepe Bernardo RN   (offgoing nurse). Report included the following information SBAR, ED Summary, Intake/Output, MAR, Recent Results and Cardiac Rhythm NSR.

## 2018-08-28 NOTE — DISCHARGE SUMMARY
Discharge Summary    Patient: Axel Olivares MRN: [id number]  CSN: [id number]    YOB: 1950  Age: 69 y.o. Sex: female    DOA: 8/27/2018 LOS:  LOS: 1 day   Discharge Date:      Primary Care Provider:  Trista Thomas MD    Admission Diagnoses: Stroke [facility])    Discharge Diagnoses:    Problem List as of 8/28/2018  Date Reviewed: 10/30/2017          Codes Class Noted - Resolved    HTN (hypertension) ICD-10-CM: I10  ICD-9-CM: 401.9  8/28/2018 - Present        * (Principal)Stroke ([facility] ICD-10-CM: I63.9  ICD-9-CM: 434.91  8/27/2018 - Present        Chronic hepatitis C ([facility] ICD-10-CM: B18.2  ICD-9-CM: 070.54  9/23/2014 - Present        Coronary artery disease ICD-10-CM: I25.10  ICD-9-CM: 414.00  9/23/2014 - Present        Post PTCA ICD-10-CM: Z98.61  ICD-9-CM: V45.82  9/23/2014 - Present              Discharge Medications:     Current Discharge Medication List      START taking these medications    Details   aspirin delayed-release 81 mg tablet Take 1 Tab by mouth daily. Qty: 30 Tab, Refills: 2      atorvastatin (LIPITOR) 40 mg tablet Take 1 Tab by mouth daily. Qty: 30 Tab, Refills: 2      clopidogrel (PLAVIX) 75 mg tab Take 1 Tab by mouth daily. Qty: 30 Tab, Refills: 2      hydroCHLOROthiazide (HYDRODIURIL) 25 mg tablet Take 1 Tab by mouth daily. Qty: 30 Tab, Refills: 2      lisinopril (PRINIVIL, ZESTRIL) 20 mg tablet Take 1 Tab by mouth daily. Qty: 30 Tab, Refills: 2         CONTINUE these medications which have NOT CHANGED    Details   azithromycin (AZASITE) 1 % ophthalmic solution Administer 1 Drop to both eyes two (2) times a day.          STOP taking these medications       aspirin (ASPIRIN) 325 mg tablet Comments:   Reason for Stopping:         alprazolam (XANAX) 2 mg tablet Comments:   Reason for Stopping:               Discharge Condition: Good        Consults: Neurology      PHYSICAL EXAM   Visit Vitals    /68 (BP 1 Location: Right arm, BP Patient Position: At rest;Sitting)   Jennifer Pulse 70    Temp 99.2 °F (37.3 °C)    Resp 16    Ht 5' 3\" (1.6 m)    Wt 44 kg (97 lb)    SpO2 100%    BMI 17.18 kg/m2     General: Awake, cooperative, no acute distress    HEENT: NC, Atraumatic. PERRLA, EOMI. Anicteric sclerae. Lungs:  CTA Bilaterally. No Wheezing/Rhonchi/Rales. Heart:  Regular  rhythm,  No murmur, No Rubs, No Gallops  Abdomen: Soft, Non distended, Non tender. +Bowel sounds,   Extremities: No c/c/e  Psych:   Not anxious or agitated. Neurological Exam:   Mental Status:  Alert , co-operative , memory intact . Cranial Nerves:  Intact visual fields. PERRL, EOM's full, no nystagmus, no ptosis. Facial sensation is normal. Facial movement is symmetric. Palate is midline. Normal sternocleidomastoid strength. Tongue is midline. Hearing is intact bilaterally. Motor:  5/5 strength in right upper and lower proximal and distal muscles. Normal bulk and tone. Left side weaker compare to left, left leg 4/5   Reflexes:  Deep tendon reflexes 2+/4 and symmetrical.    Sensory:  Normal and symmetric bilaterally. Gait:  Not tested.          Cerebellar:  No cerebellar signs present.                                            Admission HPI :   Reuben Dasilva is a 69 y.o. female who has past history of CAD, HTN presents to ER with concerns of left sided weakness and numbness. Patient reports that about a week ago she started feeling weak in her left leg, she was having problem with her ambulation. Initially she thought this is due to poor circulation and did not seek medical attention. 3 days ago she noticed weakness and numbness in left upper ext associted with slurred speech. She called her PCP who advised her to come to ER. She denies any headache, vision changes, chest pain, SOB, n/v. She smokes more than a pack a day. In ER MRI done showed Acute lacunar infarct junction of right thalamus and posterior limb internal  capsule.     Hospital Course :     Patient admitted to telemetry, she was seen and followed by neurology. CVA - MRI showed Acute lacunar infarct junction of right thalamus and posterior limb internal  Capsule. MRA head and neck showed 50% stenosis of ICA bilaterally, left vertebral artery occlusion, right vertebral artery   Echo with normal LVF, EF of >70%. No intracardiac shunt noted on administration of saline contrast.     Neurology recommended discharging on aspirin, plavix, statin which were started on admission. HTN - initially maintained permissive HTN, did not treat BP if <180/110. At discharge started on HCTZ and lisinopril. She is strongly advised to stop smoking     Activity: Activity as tolerated    Diet: Regular Diet    Follow-up: PCP, Neurology    Disposition: home    Minutes spent on discharge: 45       Labs: Results:       Chemistry Recent Labs      08/28/18   0356  08/27/18   1315   GLU  87  92   NA  142  139   K  4.1  5.0   CL  107  103   CO2  25  28   BUN  12  15   CREA  0.70  0.76   CA  8.3*  9.4   AGAP  10  8   BUCR  17  20   AP   --   46   TP   --   8.4*   ALB   --   4.4   GLOB   --   4.0   AGRAT   --   1.1      CBC w/Diff Recent Labs      08/28/18   0356  08/27/18   1315   WBC  5.2  5.0   RBC  4.17*  4.33   HGB  12.5  13.2   HCT  38.9  40.0   PLT  147  159   GRANS   --   67   LYMPH   --   25   EOS   --   1      Cardiac Enzymes Recent Labs      08/27/18   1315   CPK  240*   CKND1  1.4      Coagulation Recent Labs      08/27/18   1315   PTP  12.8   INR  1.0   APTT  27.5       Lipid Panel Lab Results   Component Value Date/Time    Cholesterol, total 177 08/28/2018 03:56 AM    HDL Cholesterol 50 08/28/2018 03:56 AM    LDL, calculated 81.8 08/28/2018 03:56 AM    VLDL, calculated 45.2 08/28/2018 03:56 AM    Triglyceride 226 (H) 08/28/2018 03:56 AM    CHOL/HDL Ratio 3.5 08/28/2018 03:56 AM      BNP No results for input(s): BNPP in the last 72 hours.    Liver Enzymes Recent Labs      08/27/18   1315   TP  8.4*   ALB  4.4   AP  46   SGOT  43*      Thyroid Studies No results found for: T4, T3U, TSH, TSHEXT, TSHEXT         Significant Diagnostic Studies: Mra Brain Wo Cont    Result Date: 8/28/2018  EXAM: Magnetic resonance arteriogram of the brain and neck without intravenous contrast. INDICATION:  Stroke. Slurred speech. COMPARISON:  No relevant prior imaging is available for comparison at the time of dictation. TECHNIQUE: MRA of the brain consists of noncontrast 3D time-of-flight imaging from the skull base to the superior margin of the lateral ventricles with 3D MIP reformations. Additional 5 mm MIP and 3-D surface reformations were performed by the radiologist on [employer] Volume Viewer and [employer] thin clients. MRA of the neck consists of 2-D time-of-flight imaging of the whole neck with 3-D reformations, and 3-D time-of-flight imaging of the carotid bifurcations with 3-D reformations. Percent stenoses are reported with reference to the downstream lumen (\"NASCET\"). _______________ FINDINGS:      MRA NECK:  Limited by noncontrast technique and motion artifact. ALENA: Roughly 50% atherosclerotic ICA bulb stenosis. LICA:  Roughly 28% atherosclerotic ICA bulb stenosis. RVA:  Moderate caliber, patent, without apparent stenosis. LVA:  Signal absence indicative of high-grade steno-occlusive disease or occlusion. BASILAR AND CEREBRAL ARTERIES:            3D image quality:  Diagnostic. ANEURYSMS: No saccular aneurysm is detected. RMCA:  Unremarkable. RACA:   Unremarkable. LMCA:   Unremarkable. LACA:   Unremarkable. Basilar artery:   Unremarkable. RPCA:   Unremarkable. LPCA:   Unremarkable. COW anatomy summary:  Right posterior cerebral artery is supplied by both the posterior communicating artery and the basilar artery.  The left posterior cerebral artery receives its dominant supply from the left anterior circulation via the left posterior communicating artery, which is fetal type physiology. _______________     IMPRESSION:    MRA NECK:            ALENA:  Approximately 50% stenosis. LICA:  Approximately 50% stenosis. RVA:  No apparent stenosis. LVA:  Signal absence indicative of high-grade steno-occlusive disease or occlusion on this noncontrast exam.    MRA BRAIN:  Negative. _______________      [facility] Neck Wo Cont    Result Date: 8/28/2018  EXAM: Magnetic resonance arteriogram of the brain and neck without intravenous contrast. INDICATION:  Stroke. Slurred speech. COMPARISON:  No relevant prior imaging is available for comparison at the time of dictation. TECHNIQUE: MRA of the brain consists of noncontrast 3D time-of-flight imaging from the skull base to the superior margin of the lateral ventricles with 3D MIP reformations. Additional 5 mm MIP and 3-D surface reformations were performed by the radiologist on [employer] Volume Viewer and [employer] thin clients. MRA of the neck consists of 2-D time-of-flight imaging of the whole neck with 3-D reformations, and 3-D time-of-flight imaging of the carotid bifurcations with 3-D reformations. Percent stenoses are reported with reference to the downstream lumen (\"NASCET\"). _______________ FINDINGS:      MRA NECK:  Limited by noncontrast technique and motion artifact. ALENA: Roughly 50% atherosclerotic ICA bulb stenosis. LICA:  Roughly 80% atherosclerotic ICA bulb stenosis. RVA:  Moderate caliber, patent, without apparent stenosis. LVA:  Signal absence indicative of high-grade steno-occlusive disease or occlusion. BASILAR AND CEREBRAL ARTERIES:            3D image quality:  Diagnostic. ANEURYSMS: No saccular aneurysm is detected. RMCA:  Unremarkable. RACA:   Unremarkable. LMCA:   Unremarkable. LACA:   Unremarkable. Basilar artery:   Unremarkable. RPCA:   Unremarkable. LPCA:   Unremarkable. COW anatomy summary:  Right posterior cerebral artery is supplied by both the posterior communicating artery and the basilar artery. The left posterior cerebral artery receives its dominant supply from the left anterior circulation via the left posterior communicating artery, which is fetal type physiology. _______________     IMPRESSION:    MRA NECK:            ALENA:  Approximately 50% stenosis. LICA:  Approximately 50% stenosis. RVA:  No apparent stenosis. LVA:  Signal absence indicative of high-grade steno-occlusive disease or occlusion on this noncontrast exam.    MRA BRAIN:  Negative. _______________      [facility] Shed Wo Cont    Result Date: 8/27/2018  CLINICAL INDICATION/HISTORY:Worsening slurred speech and numbness and tingling COMPARISON: Correlation CT head same day TECHNIQUE: Sagittal spin echo T1, axial spin echo T1, axial JENNIFER T2, FLAIR, T2 gradient and diffusion sequences, and axial and coronal T1 postgadolinium sequences of the brain were obtained. FINDINGS:  Diffusion:  Small band of restricted diffusion signal along the junction of the lateral right thalamus and posterior limb internal capsule. No other restricted diffusion signal. Brain parenchyma:  Mild low T1 and increased T2 and FLAIR signal associated with acute infarct right thalamic internal capsule junction. Small chronic infarcts superior right lentiform nucleus and right superior frontal deep white matter. Additional mild confluent and multifocal increased T2 and FLAIR signal periventricular and deep cerebral white matter in a nonspecific pattern. Mild FLAIR hyperintensity adjacent to polypoid dural based enhancing lesion inferior left frontal lobe. Ventricles and sulci:  Normal.  No significant atrophy given age.  Extra-axial:  There is a oval-shaped slightly lobulated densely enhancing lesion along the inferior left frontal lobe which has slightly low T1 and mildly low T2 signal. This has small area of contiguity with adjacent dura along the floor of the left anterior cranial fossa, extending medially. This probably is extra-axial in location with some adjacent deformity of the left frontal cortex with mild adjacent parenchymal edema. This measures maximally 13 x 7 x 7 mm in greatest transverse, cephalocaudad, and AP dimensions. No other abnormal extra-axial fluid collection or mass. Brain vasculature:  Absent flow void left intradural vertebral artery. Incidental likely persistent left trigeminal artery, normal variant. Craniocervical junction:  Normal. Skull base, extracranial and calvarium:  Orbits, paranasal sinuses, IACs unremarkable. Mild increased T2 signal inferior left mastoid. Partial empty sella. No skull abnormality. Contrast:There is no other abnormal enhancement. Impression: 1. Acute lacunar infarct junction of right thalamus and posterior limb internal capsule. 2. Small chronic infarcts superior right basal ganglia and right frontal deep white matter. Mild bilateral deep white matter signal changes nonspecific, likely chronic microvascular ischemic changes. 3. 13 mm enhancing probably extra-axial lesion with small dural base of attachment along floor of left anterior cranial fossa with small adjacent brain parenchymal mass effect and edema. This is partially calcified on CT, likely representing meningioma. 4. Absent flow void distal left vertebral artery, suggestive either proximal stenosis or occlusion, although could also be related to slow flow in a developmentally small vessel. Ct Head Wo Cont    Result Date: 8/27/2018  EXAM: CT head INDICATION: Gradual onset of left-sided weakness and slurred speech which is progressing in severity. COMPARISON: None.  TECHNIQUE: Axial CT imaging of the head was performed without intravenous contrast. One or more dose reduction techniques were used on this CT: automated exposure control, adjustment of the mAs and/or kVp according to patient's size, and iterative reconstruction techniques. The specific techniques utilized on this CT exam have been documented in the patient's electronic medical record. _______________ FINDINGS: BRAIN AND POSTERIOR FOSSA: Mild cortical and cerebellar volume loss is noted, which is within normal limits for patient age. Ventricular size and configuration is within normal limits. Basilar cisterns are patent. Subcortical and periventricular white matter hypoattenuation is noted. Additional more focal area of low-attenuation is present with in the right basal (series 2, image 11). There is no intracranial hemorrhage, mass effect, or midline shift. The gray-white matter differentiation is within normal limits. EXTRA-AXIAL SPACES AND MENINGES: Partially calcified lesion arising from the inferior aspect of the anterior left cranial fossa noted, best appreciated on coronal image numbers 9-10. No acute extra-axial fluid collection. CALVARIUM: No acute osseous abnormality. SINUSES: Imaged paranasal sinuses and mastoid air cells are clear. OTHER: Faint atherosclerotic calcifications of the carotid siphons are noted bilaterally. _______________     IMPRESSION: 1. No acute intra-axial or extra-axial hemorrhage. No mass effect or midline shift. 2. Focal area of low attenuation within the right basal ganglia, potentially reflecting an area of age indeterminate infarction. 3. Possible meningioma versus osteoma arising from the left anterior cranial fossa. Comparison with prior cross-sectional imaging is recommended if available.  If no prior imaging is available, further, nonemergent characterization can be performed with an MRI examination without and with contrast. 4. Mild subcortical and periventricular white matter hypoattenuation; a nonspecific finding as can be seen in the context of chronic ischemic microvascular change. Xr Chest Port    Result Date: 8/27/2018  EXAM: Chest portable INDICATION: Stroke COMPARISON: None. _______________ FINDINGS: AP portable chest film was performed. Lungs are well expanded and clear. No effusion or pneumothorax. Heart and pulmonary vascularity are normal. _______________     IMPRESSION: No acute cardiopulmonary disease. No results found for this or any previous visit.         CC: Elsa Banerjee MD

## 2018-08-28 NOTE — ROUTINE PROCESS
2020: TRANSFER - IN REPORT:    Verbal report received from [facility] (name) on [employer]  being received from ED(unit) for routine progression of care      Report consisted of patients Situation, Background, Assessment and   Recommendations(SBAR). Information from the following report(s) SBAR, Kardex, Intake/Output, MAR, Recent Results and Cardiac Rhythm NSR was reviewed with the receiving nurse. Opportunity for questions and clarification was provided. Assessment completed upon patients arrival to unit and care assumed.

## 2018-08-28 NOTE — PROGRESS NOTES
INITIAL NUTRITION ASSESSMENT 
  
RECOMMENDATIONS/PLAN:  
Will order ensure compact TID to aid in meeting estimated needs Monitor weight/fluid status and trend for additional weight loss Monitor bowel function, skin assessment, labs/lytes, fluid status Will continue to follow and adjust reccs as needed REASON FOR ASSESSMENT:  
 
[x] Positive Nutrition Screen: 
[x] BMI <19 [] NPO/Clear Liquid Diet > 5 days (>3 days in ICU) [] New Order for TPN 
 
NUTRITION ASSESSMENT:  
Client History: 69 yrs old Female admitted with c/o left sided weakness and numbness. PMHx: CAD, HTN, chronic hepatitis Cultural/Amish Food Preferences: None Identified FOOD/NUTRITION HISTORY Diet History: nutritional deficiencies likely PTA Food Allergies:  [x] NKFA Pertinent PTA Medications: n/a NUTRITION INTAKE Diet Order:  cardiac Average PO Intake:      
Patient Vitals for the past 100 hrs: 
 % Diet Eaten 08/28/18 0914 50 % 08/27/18 2020 0 % Pertinent Medications:  [x] Reviewed Electrolyte Replacement Protocol: []K  []Mg  []PO4 Insulin:  [] SSI  [] Pre-meal   []  Basal   [] Drip  [x] None Pt expected to meet estimated nutrient needs through next review:          [x]  Yes      ANTHROPOMETRICS Height: 5' 3\" (160 cm)       Weight: 44 kg (97 lb) BMI: 17.2 kg/m^2  -  underweight (Less than or = to 18.5% BMI) Weight change: possible wt loss of approx 8lbs x 10 months, or 7.6% body weight Comparison to Reference Standards: IBW: 115 lbs      %IBW: 84%      AdjBW: n/a kg NUTRITION-FOCUSED PHYSICAL ASSESSMENT Skin: no pressure area per documentation GI: +formed stool per MD note BIOCHEMICAL DATA & MEDICAL TESTS Pertinent Labs:  [x] Reviewed NUTRITION PRESCRIPTION Calories: 5728-2067 kcal/day based on 30-35kcal/kg Protein: 53 g/day based on 1.2 g/kg Fluid: 8907-4869 ml/day based on 1 kcal/ml NUTRITION DIAGNOSES:  
 1. Predicted suboptimal energy intake related to increased kcal needs as evidenced by BMI 17, wt loss of approx 7.6% body weight NUTRITION INTERVENTIONS:  
INTERVENTIONS:        GOALS: 
1. Commercial beverage: ensure compact 1. PO intakes of ensure compact and meals >50% throughout the next 3 days LEARNING NEEDS (Diet, Supplementation, Food/Nutrient-Drug Interaction):  
[x] None Identified 
[] Inpatient education provided/documented   
[] Identified and patient:  [] Declined     [] Was not appropriate/indicated NUTRITION MONITORING /EVALUATION:  
Follow PO intake Monitor wt Monitor renal labs, electrolytes, fluid status Monitor for additional supplement needs 
 
[] Participated in Interdisciplinary Rounds 
[x] [address] [facility] Reviewed/Documented DISCHARGE NUTRITION RECOMMENDATIONS ADDRESSED:  
 
  [x] To be determined closer to discharge NUTRITION RISK:     [x]  At risk                     []  Not currently at risk Will follow-up per policy. [place], [address] [facility]

## 2018-08-28 NOTE — H&P
History & Physical    Patient: Steve Pizano MRN: [id number]  CSN: [id number]    YOB: 1950  Age: 69 y.o. Sex: female      DOA: 8/27/2018  Primary Care Provider:  Letha Dave MD      Assessment/Plan     Patient Active Problem List   Diagnosis Code    Chronic hepatitis C ([facility] B18.2    Coronary artery disease I25.10    Post PTCA Z98.61    Colitis K52.9    Stroke ([facility] I63.9       Admit to telemetry  CVA - MRI showed Acute lacunar infarct junction of right thalamus and posterior limb internal  capsule. Get CTA of head and neck  Echo 2d doppler   Get fasting lipid panel, HbA1c   Continue with antiplatelet agent   Will use antihypertensive only if blood pressure systolic is above 606 and diastolic above 676   Regular neurochecks q4 hrly   Start the patient on iv fluids   Will keep the patient on telemetry to rule out any arrythmias   DVT prophylaxis        Estimated length of stay :1-2 days    CC: left sided weakness and numbness       HPI:     Steve Pizano is a 69 y.o. female who has past history of CAD, HTN presents to ER with concerns of left sided weakness and numbness. Patient reports that about a week ago she started feeling weak in her left leg, she was having problem with her ambulation. Initially she thought this is due to poor circulation and did not seek medical attention. 3 days ago she noticed weakness and numbness in left upper ext associted with slurred speech. She called her PCP who advised her to come to ER. She denies any headache, vision changes, chest pain, SOB, n/v. In ER MRI done showed Acute lacunar infarct junction of right thalamus and posterior limb internal  capsule. Past Medical History:   Diagnosis Date    CAD (coronary artery disease)     Hypertension        Past Surgical History:   Procedure Laterality Date    HX HYSTERECTOMY      HX REFRACTIVE SURGERY         History reviewed. No pertinent family history.     Social History     Social History  Marital status: UNKNOWN     Spouse name: N/A    Number of children: N/A    Years of education: N/A     Social History Main Topics    Smoking status: Current Some Day Smoker     Packs/day: 1.00     Types: Cigarettes    Smokeless tobacco: Never Used    Alcohol use 0.0 oz/week     0 Standard drinks or equivalent per week      Comment: occasionally    Drug use: Yes     Special: Marijuana    Sexual activity: Not Asked     Other Topics Concern    None     Social History Narrative       Prior to Admission medications    Medication Sig Start Date End Date Taking? Authorizing Provider   azithromycin (AZASITE) 1 % ophthalmic solution Administer 1 Drop to both eyes two (2) times a day. Yes Phys Other, MD   aspirin (ASPIRIN) 325 mg tablet Take 325 mg by mouth daily. Yes Historical Provider   alprazolam Dasie Dorian) 2 mg tablet Take  by mouth. Yes Historical Provider       Allergies   Allergen Reactions    Ampicillin Other (comments)    Codeine Other (comments)    Pcn [Penicillins] Other (comments)    Tetracyclines Other (comments)       Review of Systems  Gen: No fever, chills, malaise, weight loss/gain. Heent: No headache, rhinorrhea, epistaxis, ear pain, hearing loss, sinus pain, neck pain/stiffness, sore throat. Heart: No chest pain, palpitations, ALANIZ, pnd, or orthopnea. Resp: No cough, hemoptysis, wheezing and shortness of breath. GI: No nausea, vomiting, diarrhea, constipation, melena or hematochezia. : No urinary obstruction, dysuria or hematuria. Derm: No rash, new skin lesion or pruritis. Musc/skeletal: no bone or joint complains. Vasc: No edema, cyanosis or claudication. Endo: No heat/cold intolerance, no polyuria,polydipsia or polyphagia. Neuro: see above. Heme: No easy bruising or bleeding.           Physical Exam:     Physical Exam:  Visit Vitals    /64    Pulse 71    Temp 97.6 °F (36.4 °C)    Resp 16    Ht 5' 2\" (1.575 m)    Wt 46.3 kg (102 lb)    SpO2 100%    BMI 18.66 kg/m2      O2 Device: Room air    Temp (24hrs), Av.8 °F (36.6 °C), Min:97.6 °F (36.4 °C), Max:98.1 °F (36.7 °C)             General:  Awake, cooperative, no distress. Head:  Normocephalic, without obvious abnormality, atraumatic. Eyes:  Conjunctivae/corneas clear, sclera anicteric, PERRL, EOMs intact. Nose: Nares normal. No drainage or sinus tenderness. Throat: Lips, mucosa, and tongue normal.    Neck: Supple, symmetrical, trachea midline, no adenopathy. Lungs:   Clear to auscultation bilaterally. Heart:  Regular rate and rhythm, S1, S2 normal, no murmur, click, rub or gallop. Abdomen: Soft, non-tender. Bowel sounds normal. No masses,  No organomegaly. Extremities: Extremities normal, atraumatic, no cyanosis or edema. Capillary refill normal.   Pulses: 2+ and symmetric all extremities. Skin: Skin color pink, turgor normal. No rashes or lesions       Neurological Exam:   Mental Status:  Alert , co-operative , memory intact . Cranial Nerves:  Intact visual fields. PERRL, EOM's full, no nystagmus, no ptosis. Facial sensation is normal. Facial movement is symmetric. Palate is midline. Normal sternocleidomastoid strength. Tongue is midline. Hearing is intact bilaterally. Motor:  5/5 strength in right upper and lower proximal and distal muscles. Normal bulk and tone. Left side weaker compare to left, left leg 4/5   Reflexes:  Deep tendon reflexes 2+/4 and symmetrical.    Sensory:  Normal and symmetric bilaterally. Gait:  Not tested. Cerebellar:  No cerebellar signs present.             Labs Reviewed:    CMP:   Lab Results   Component Value Date/Time     2018 01:15 PM    K 5.0 2018 01:15 PM     2018 01:15 PM    CO2 28 2018 01:15 PM    AGAP 8 2018 01:15 PM    GLU 92 2018 01:15 PM    BUN 15 2018 01:15 PM    CREA 0.76 2018 01:15 PM    GFRAA >60 2018 01:15 PM    GFRNA >60 2018 01:15 PM    CA 9.4 08/27/2018 01:15 PM    MG 2.4 08/27/2018 01:15 PM    ALB 4.4 08/27/2018 01:15 PM    TP 8.4 (H) 08/27/2018 01:15 PM    GLOB 4.0 08/27/2018 01:15 PM    AGRAT 1.1 08/27/2018 01:15 PM    SGOT 43 (H) 08/27/2018 01:15 PM    ALT 19 08/27/2018 01:15 PM     CBC:   Lab Results   Component Value Date/Time    WBC 5.0 08/27/2018 01:15 PM    HGB 13.2 08/27/2018 01:15 PM    HCT 40.0 08/27/2018 01:15 PM     08/27/2018 01:15 PM     All Cardiac Markers in the last 24 hours:   Lab Results   Component Value Date/Time     (H) 08/27/2018 01:15 PM    CKMB 3.4 08/27/2018 01:15 PM    CKND1 1.4 08/27/2018 01:15 PM    TROIQ <0.02 08/27/2018 01:15 PM         Procedures/imaging: see electronic medical records for all procedures/Xrays and details which were not copied into this note but were reviewed prior to creation of Plan        CC: Elsa Banerjee MD

## 2018-08-28 NOTE — PROGRESS NOTES
Chart reviewed. Pt admitted for stroke. CM will follow for discharge planning needs. 1000  Met with patient at bedside. Pt lives with her brother and sister. Pt independent. Pt drives herself to appts. Pt has a cane, walker available but does not use them currently. Pts home address confirmed per face sheet. Discussed the possibility of home health and pt does not feel that will be needed. Pts PCP is MD Sreedhar Garvin. Pt states she is ready to be discharged. No immediate concerns identified. CM will cont to follow. [address] with MD Demetria Hernandes. Pt will likely dc today. PT/OT/Speech eval order placed, per verbal order from MD Demetria Hernandes. 1450  Spoke with pt again at bedside. FOC offered for [facility]. Pt chose [facility]. Referral will be placed. Reason for Admission:   Per H&P, pt is a 69 y.o. female who has past history of CAD, HTN presents to ER with concerns of left sided weakness and numbness. Patient reports that about a week ago she started feeling weak in her left leg, she was having problem with her ambulation. Initially she thought this is due to poor circulation and did not seek medical attention. 3 days ago she noticed weakness and numbness in left upper ext associted with slurred speech. She called her PCP who advised her to come to ER. She denies any headache, vision changes, chest pain, SOB, n/v. In ER MRI done showed Acute lacunar infarct junction of right thalamus and posterior limb internal  capsule. RRAT Score:     14 mod             Do you (patient/family) have any concerns for transition/discharge? None identified              Plan for utilizing home health:     None at this time. Provider, please consider ordering PT/OT eval to assist in discharge planning     Likelihood of readmission?   mod            Transition of Care Plan:     Discharge home with MD follow up and family assistance      Care Management Interventions  PCP Verified by CM:  Yes  Mode of Transport at Discharge:  Other (see comment) (family)  Transition of Care Consult (CM Consult): Home Health, Discharge [address] \[facility]\"": Yes  Physical Therapy Consult: Yes  Occupational Therapy Consult: Yes  Speech Therapy Consult: Yes  Current Support Network: [address] [facility] discussed with Pt/Family/Caregiver: Yes  Freedom of Choice Offered: Yes  Discharge Location  Discharge Placement: Home with home health

## 2018-08-28 NOTE — PROGRESS NOTES
Pt was transferred from ER to the floor @ approx 2025. I had talk to ER before pt was transferred about pt needing a larger IV access @1112 talk to [facility] about pt needing a larger IV access. She stated she had not gotten a report from ER but she   was going to try to get another IV. I told her to call when she did.  LD @ [phone]

## 2018-08-28 NOTE — PROGRESS NOTES
Problem: Mobility Impaired (Adult and Pediatric) Goal: *Acute Goals and Plan of Care (Insert Text) Physical Therapy Goals Initiated 8/28/2018 and to be accomplished within 3-7 day(s) 1. Patient will move from supine to sit and sit to supine  in bed with modified independence. 2.  Patient will transfer from bed to chair and chair to bed with modified independence using the least restrictive device. 3.  Patient will perform sit to stand with modified independence. 4.  Patient will ambulate with modified independence for 150 feet with the least restrictive device. 5.  Patient will ascend/descend 3 stairs with handrail(s) with modified independence. Outcome: Progressing Towards Goal 
physical Therapy EVALUATION Patient: Oscar Grider (73 y.o. female) Date: 8/28/2018 Primary Diagnosis: Stroke ([facility] Precautions:   Fall ASSESSMENT : 
Based on the objective data described below, the patient presents with lower extremity weakness, decreased gait quality and endurance, impaired bed mobility and transfers, and overall limitations in functional mobility s/p acute lacunar infarct involving posterior limb of internal capsule junction of thalamus on R side. Pt has had CVAs in the past however was not aware of them until this admission. Pt lives with brother who works. Pt performed supine to sit with supervision, sit to stand with supervision. Patient ambulated 300 feet without AD, GB applied, CGA due to gait deviations, intermittent imbalance, occasional scissoring, and path deviations. Pt tolerated session well as evidenced by no c/o increased pain, no lightheadedness or dizziness. Patient would benefit from skilled inpatient physical therapy to address deficits, progress as tolerated to achieve long term goals and allow safe discharge. Patient will benefit from skilled intervention to address the above impairments. Patients rehabilitation potential is considered to be Good Factors which may influence rehabilitation potential include:  
[]         None noted 
[]         Mental ability/status [x]         Medical condition 
[]         Home/family situation and support systems 
[x]         Safety awareness [x]         Pain tolerance/management 
[]         Other: PLAN : 
Recommendations and Planned Interventions: 
[x]           Bed Mobility Training             [x]    Neuromuscular Re-Education 
[x]           Transfer Training                   []    Orthotic/Prosthetic Training 
[x]           Gait Training                          []    Modalities [x]           Therapeutic Exercises          []    Edema Management/Control 
[x]           Therapeutic Activities            [x]    Patient and Family Training/Education 
[]           Other (comment): Frequency/Duration: Patient will be followed by physical therapy 1-2 times per day to address goals. Discharge Recommendations: Home Health Further Equipment Recommendations for Discharge: N/A  
 
SUBJECTIVE:  
Patient stated I feel fine.  OBJECTIVE DATA SUMMARY:  
 
Past Medical History:  
Diagnosis Date  CAD (coronary artery disease)  Hypertension Past Surgical History:  
Procedure Laterality Date  HX HYSTERECTOMY  HX REFRACTIVE SURGERY Barriers to Learning/Limitations: None Compensate with: N/A Prior Level of Function/Home Situation: Independent amb s/AD Home Situation Home Environment: Private residence # Steps to Enter: 4 Rails to Enter: Yes Hand Rails : Bilateral 
Wheelchair Ramp: No 
One/Two Story Residence: One story Living Alone: No 
Support Systems: Family member(s) (brother & sister) Patient Expects to be Discharged to[de-identified] Private residence Current DME Used/Available at Home: None Tub or Shower Type: Tub/Shower combination Critical Behavior: 
Neurologic State: Alert Orientation Level: Oriented X4 Cognition: Decreased attention/concentration; Follows commands; Impaired decision making (baseline) Safety/Judgement: Awareness of environment;Decreased insight into deficits Psychosocial 
Patient Behaviors: Calm; Cooperative Family  Behaviors: Hyper-vigilant Needs Expressed: Educational 
Purposeful Interaction: Yes Pt Identified Daily Priority: Clinical issues (comment) Caritas Process: Nurture loving kindness;Enable villa/hope;Establish trust;Nurture spiritual self;Teaching/learning; Attend basic human needs;Create healing environment Caring Interventions: Reassure Reassure: Therapeutic listening; Informing; Acceptance; Instilling villa and hope;Support family Therapeutic Modalities: Deep breathing;Humor; Intentional therapeutic touch Family  Behaviors: Hyper-vigilant Strength:   
Strength: Generally decreased, functional 
Tone & Sensation:  
Tone: Normal 
Sensation: Intact Range Of Motion: 
AROM: Within functional limits PROM: Within functional limits Functional Mobility: 
Bed Mobility: 
Supine to Sit: Supervision Sit to Supine: Supervision Scooting: Supervision;Modified independent Transfers: 
Sit to Stand: Supervision Stand to Sit: Supervision Bed to Chair: Supervision Balance:  
Sitting: Intact Standing: Impaired; Without support Standing - Static: Good Standing - Dynamic : Fair Ambulation/Gait Training: 
Distance (ft): 300 Feet (ft) Assistive Device: Gait belt Ambulation - Level of Assistance: Contact guard assistance Gait Description (WDL): Exceptions to [facility] Gait Abnormalities: Decreased step clearance; Path deviations; Shuffling gait Speed/Joie: Slow;Pace decreased (<100 feet/min) Step Length: Right shortened;Left shortened Pain: 
Pain Scale 1: Numeric (0 - 10) Pain Intensity 1: 0 Activity Tolerance:  
Good Please refer to the flowsheet for vital signs taken during this treatment. After treatment:  
[]         Patient left in no apparent distress sitting up in chair 
[x]         Patient left in no apparent distress in bed [x]         Call bell left within reach [x]         Nursing notified 
[]         Caregiver present 
[]         Bed alarm activated COMMUNICATION/EDUCATION:  
[x]         Fall prevention education was provided and the patient/caregiver indicated understanding. [x]         Patient/family have participated as able in goal setting and plan of care. [x]         Patient/family agree to work toward stated goals and plan of care. []         Patient understands intent and goals of therapy, but is neutral about his/her participation. []         Patient is unable to participate in goal setting and plan of care. Thank you for this referral. 
Christy Zaldivar Time Calculation: 24 mins Eval Complexity: History: MEDIUM  Complexity : 1-2 comorbidities / personal factors will impact the outcome/ POC Exam:LOW Complexity : 1-2 Standardized tests and measures addressing body structure, function, activity limitation and / or participation in recreation  Presentation: LOW Complexity : Stable, uncomplicated  Clinical Decision Making:Low Complexity amb >30 ft s/AD, CGA for safety, cues for safety Overall Complexity:LOW  Mobility  Current  CJ= 20-39%   Goal  CI= 1-19%. The severity rating is based on the Level of Assistance required for Functional Mobility and ADLs.

## 2018-08-29 ENCOUNTER — PATIENT OUTREACH (OUTPATIENT)
Dept: CASE MANAGEMENT | Age: 68
End: 2018-08-29

## 2018-08-29 LAB
ATRIAL RATE: 54 BPM
CALCULATED P AXIS, ECG09: 59 DEGREES
CALCULATED R AXIS, ECG10: 57 DEGREES
CALCULATED T AXIS, ECG11: 28 DEGREES
DIAGNOSIS, 93000: NORMAL
P-R INTERVAL, ECG05: 168 MS
Q-T INTERVAL, ECG07: 466 MS
QRS DURATION, ECG06: 78 MS
QTC CALCULATION (BEZET), ECG08: 441 MS
VENTRICULAR RATE, ECG03: 54 BPM

## 2018-08-29 NOTE — PHYSICIAN ADVISORY
Letter of admission status determination Rosalva Kinsey Age: 69 y.o. MRN: [id number] [facility]:  [id number] Date of admission: 8/27/2018 I have reviewed this case as it involves a Medicare patient admitted as inpatient that has not been hospitalized for at least two midnights and has a discharge order placed. Patient's condition and the documented plan of care at the time of admission do not fully support the expectation that the patient would require medically necessary hospital care for two or more midnights. Therefore, Outpatient OBSERVATION would have been appropriate. Davis Morse MD, MARGARITA, FACP, [facility] Physician Advisor [address] [place] [address]. 
[phone] August 29, 2018   8:19 AM

## 2018-08-29 NOTE — PROGRESS NOTES
Hospital Discharge Follow-Up Date/Time:  2018 3:08 PM 
 
Patient was admitted to [facility] on 18 and discharged on 18 for stroke. The physician discharge summary was available at the time of outreach. NN attempted to contact patient within 2 business days of discharge. Inpatient RRAT score: 14 Was this a readmission? no  
Patient stated reason for the readmission: NA 
 
NN attempted to contact patient at listed number. VM left identifying self, direct contact information given with request for return call. NN will attempt to contact patient at later time. Hospital Discharge Follow-Up Date/Time:  2018 3:50 PM 
 
 
Patient returned call. Verified name and  with patient as identifiers. Provided introduction to self, and explanation of the Nurse Navigator role. Reviewed discharge instructions and red flags with patient who verbalized understanding. Patient given an opportunity to ask questions and does not have any further questions or concerns at this time. The patient agrees to contact the PCP office for questions related to their healthcare. NN provided contact information for future reference. Disease Specific:   N/A Summary of patient's top problems: 1. Stroke - patient states that she is \"doing well\" Home Health orders at discharge: SN/PT/OT Home Health company: [facility] Date of initial visit: patient reports that she missed call from agency today. Durable Medical Equipment ordered/company: NA 
Durable Medical Equipment received: NA Barriers to care? None noted at this time Advance Care Planning:  
Does patient have an Advance Directive:  not on file Medication(s):  
New Medications at Discharge: yes Changed Medications at Discharge: no 
Discontinued Medications at Discharge: yes:  mg; Xanax - patient reports that she \"can't just stop it since I have been taking it for years for my high anxiety issues. I will talk to my doctor about this when I see him\". Patient verbalizes understanding of administration of home medications. There were no barriers to obtaining medications identified at this time. Referral to Pharm D needed: no  
 
Current Outpatient Prescriptions Medication Sig  
 aspirin delayed-release 81 mg tablet Take 1 Tab by mouth daily.  atorvastatin (LIPITOR) 40 mg tablet Take 1 Tab by mouth daily.  clopidogrel (PLAVIX) 75 mg tab Take 1 Tab by mouth daily.  hydroCHLOROthiazide (HYDRODIURIL) 25 mg tablet Take 1 Tab by mouth daily.  lisinopril (PRINIVIL, ZESTRIL) 20 mg tablet Take 1 Tab by mouth daily.  azithromycin (AZASITE) 1 % ophthalmic solution Administer 1 Drop to both eyes two (2) times a day. No current facility-administered medications for this visit. There are no discontinued medications. [facility] follow up appointment(s): Future Appointments Date Time Provider Alvaro Dodd 8/30/2018 To Be Chapito Rene,  [facility], [facility]  
8/31/2018 To Be Determined Carin Martin, OTR/L [address] [facility], [facility]  
10/30/2018 8:00 AM Wally Wheeler, NP [address] [facility] follow up appointment(s): Dr. Sadiq Moyer - 8/31/18. Dr. Ken Young - patient has not yet heard from office about scheduled appointment. [facility]:  n/a  
 
 
Goals  Attends follow-up appointments as directed. Dr. Sadiq Moyer - PCP Dr. Ken Young - neurology [address]  Understands red flags post discharge. Patient/family will be able to verbalize signs of a stroke: ¨ Sudden numbness, tingling, weakness, or loss of movement in face, arm, or leg, especially on only one side of the body. ¨ Sudden vision changes. ¨ Sudden trouble speaking. ¨ Sudden confusion or trouble understanding simple statements. ¨ Sudden problems with walking or balance. A sudden, severe headache that is different from past headaches Patient/family will be able to verbalize measures to prevent another stroke: *         Work with physician to treat any health problems such as: High blood pressure, high cholesterol, atrial fibrillation, and diabetes; all raise chances of having another stroke. · Be safe with medicines. Take medications exactly as prescribed. Notify physician if issues/concerns about medications arise. · Have a healthy lifestyle. ¨ Do not smoke or allow others to smoke around you. If you need help quitting, talk to your doctor about stop-smoking programs and medicines. These can increase your chances of quitting for good. Smoking makes a stroke more likely. ¨ Limit alcohol to 2 drinks a day for men and 1 drink a day for women. ¨ Lose weight if you need to. A healthy weight will help you keep your heart and body healthy. ¨ Be active. Ask your doctor what type and level of activity is safe for you. ¨ Eat heart-healthy foods, like fruits, vegetables, and high-fiber foods. Follow-up care is a key part of your treatment and safety. Be sure to make and go to all appointments, and call your doctor if you are having problems. It's also a good idea to know your test results and keep a list of the medicines you take.

## 2018-08-31 ENCOUNTER — HOME CARE VISIT (OUTPATIENT)
Dept: HOME HEALTH SERVICES | Facility: HOME HEALTH | Age: 68
End: 2018-08-31

## 2018-09-11 ENCOUNTER — PATIENT OUTREACH (OUTPATIENT)
Dept: CASE MANAGEMENT | Age: 68
End: 2018-09-11
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
## 2018-12-20 ENCOUNTER — OFFICE VISIT (OUTPATIENT)
Dept: HEMATOLOGY | Age: 68
End: 2018-12-20

## 2018-12-20 ENCOUNTER — HOSPITAL ENCOUNTER (OUTPATIENT)
Dept: LAB | Age: 68
Discharge: HOME | End: 2018-12-20
Payer: MEDICARE

## 2018-12-20 VITALS
HEART RATE: 85 BPM | OXYGEN SATURATION: 96 % | BODY MASS INDEX: 18.14 KG/M2 | DIASTOLIC BLOOD PRESSURE: 82 MMHG | SYSTOLIC BLOOD PRESSURE: 180 MMHG | RESPIRATION RATE: 18 BRPM | TEMPERATURE: 97.1 F | HEIGHT: 63 IN | WEIGHT: 102.4 LBS

## 2018-12-20 DIAGNOSIS — R76.8 HCV ANTIBODY POSITIVE: ICD-10-CM

## 2018-12-20 DIAGNOSIS — B18.2 CHRONIC HEPATITIS C WITH HEPATIC COMA (HCC): ICD-10-CM

## 2018-12-20 DIAGNOSIS — B18.2 CHRONIC HEPATITIS C WITH HEPATIC COMA (HCC): Primary | ICD-10-CM

## 2018-12-20 LAB
ALBUMIN SERPL-MCNC: 4.4 G/DL (ref 3.4–5)
ALBUMIN/GLOB SERPL: 1.2 {RATIO} (ref 0.8–1.7)
ALP SERPL-CCNC: 53 U/L (ref 45–117)
ALT SERPL-CCNC: 17 U/L (ref 13–56)
ANION GAP SERPL CALC-SCNC: 5 MMOL/L (ref 3–18)
AST SERPL-CCNC: 15 U/L (ref 15–37)
BASOPHILS # BLD: 0 K/UL (ref 0–0.1)
BASOPHILS NFR BLD: 1 % (ref 0–2)
BILIRUB DIRECT SERPL-MCNC: 0.1 MG/DL (ref 0–0.2)
BILIRUB SERPL-MCNC: 0.3 MG/DL (ref 0.2–1)
BUN SERPL-MCNC: 11 MG/DL (ref 7–18)
BUN/CREAT SERPL: 14
CALCIUM SERPL-MCNC: 9.3 MG/DL (ref 8.5–10.1)
CHLORIDE SERPL-SCNC: 107 MMOL/L (ref 100–108)
CO2 SERPL-SCNC: 29 MMOL/L (ref 21–32)
CREAT SERPL-MCNC: 0.76 MG/DL (ref 0.6–1.3)
DIFFERENTIAL METHOD BLD: ABNORMAL
EOSINOPHIL # BLD: 0.1 K/UL (ref 0–0.4)
EOSINOPHIL NFR BLD: 1 % (ref 0–5)
ERYTHROCYTE [DISTWIDTH] IN BLOOD BY AUTOMATED COUNT: 17.1 % (ref 11.6–14.5)
GLOBULIN SER CALC-MCNC: 3.7 G/DL (ref 2–4)
GLUCOSE SERPL-MCNC: 90 MG/DL (ref 74–99)
HCT VFR BLD AUTO: 44.2 % (ref 35–45)
HGB BLD-MCNC: 13.8 G/DL (ref 12–16)
LYMPHOCYTES # BLD: 1.4 K/UL (ref 0.9–3.6)
LYMPHOCYTES NFR BLD: 29 % (ref 21–52)
MCH RBC QN AUTO: 28 PG (ref 24–34)
MCHC RBC AUTO-ENTMCNC: 31.2 G/DL (ref 31–37)
MCV RBC AUTO: 89.8 FL (ref 74–97)
MONOCYTES # BLD: 0.3 K/UL (ref 0.05–1.2)
MONOCYTES NFR BLD: 7 % (ref 3–10)
NEUTS SEG # BLD: 3 K/UL (ref 1.8–8)
NEUTS SEG NFR BLD: 62 % (ref 40–73)
PLATELET # BLD AUTO: 177 K/UL (ref 135–420)
PMV BLD AUTO: 11.4 FL (ref 9.2–11.8)
POTASSIUM SERPL-SCNC: 4.7 MMOL/L (ref 3.5–5.5)
PROT SERPL-MCNC: 8.1 G/DL (ref 6.4–8.2)
RBC # BLD AUTO: 4.92 M/UL (ref 4.2–5.3)
SODIUM SERPL-SCNC: 141 MMOL/L (ref 136–145)
WBC # BLD AUTO: 4.8 K/UL (ref 4.6–13.2)

## 2018-12-20 PROCEDURE — 87522 HEPATITIS C REVRS TRNSCRPJ: CPT

## 2018-12-20 PROCEDURE — 85025 COMPLETE CBC W/AUTO DIFF WBC: CPT

## 2018-12-20 PROCEDURE — 80076 HEPATIC FUNCTION PANEL: CPT

## 2018-12-20 PROCEDURE — 36415 COLL VENOUS BLD VENIPUNCTURE: CPT

## 2018-12-20 PROCEDURE — 80048 BASIC METABOLIC PNL TOTAL CA: CPT

## 2018-12-20 RX ORDER — ALPRAZOLAM 2 MG/1
2 TABLET ORAL 3 TIMES DAILY
COMMUNITY
Start: 2018-12-19

## 2018-12-20 NOTE — PROGRESS NOTES
[address] MD, [place], [place], [place]       MAYO Anders PA-C Fraser Manos, ACNP-[facility], MAYO Dailey NP        at [address], [address] [address]     [phone]     FAX: [phone]    at [address], [address]     [phone]     FAX: [phone]         Patient Care Team:  Lilian Arechiga MD as PCP - General (Family Practice)      Problem List  Date Reviewed: 2018          Codes Class Noted    HCV antibody positive ICD-10-CM: R76.8  ICD-9-CM: 795.79  2018    Overview Signed 2018  8:54 AM by Nadir Rich NP     SVR , Harvoni X 12 weeks             HTN (hypertension) ICD-10-CM: I10  ICD-9-CM: 401.9  2018        Stroke [facility]) ICD-10-CM: I63.9  ICD-9-CM: 434.91  2018        Coronary artery disease ICD-10-CM: I25.10  ICD-9-CM: 414.00  2014        Post PTCA ICD-10-CM: Z98.61  ICD-9-CM: V45.82  2014              Melia Kinsey returns to the [address] for management of chronic HCV. She began HCV on 2016 with once daily Harvoni and completed the 12 weeks of therapy on 2016. She was SVR 12. This is the first time that her HCV has been treated. The active problem list, all pertinent past medical history, medications, liver histology, radiologic findings and laboratory findings related to the liver disorder were reviewed with the patient. The patient is a 77 y.o.  female who requested to be tested and was positive for HCV after her   of HCV and cirrhosis in . Ultrasound of the liver was performed in 10/2014. Results suggest that the liver is normal.      A liver biopsy has not been performed.         The most recent laboratory studies indicate that the liver transaminases are normal, alkaline phosphatase is normal, tests of hepatic synthetic and metabolic function are normal, and the platelet count is depressed. The patient complains of fatigue. The patient has functional limitations secondary to these complaints. \"I don't think that this has anything to do with my liver, I think it's my heart\". The patient has not experienced pain in the right side over the liver, arthralgias, myalgias, problems concentrating, swelling of the abdomen, swelling of the lower extremities, hematemesis, or hematochezia. ALLERGIES  Allergies   Allergen Reactions    Ampicillin Other (comments)    Codeine Other (comments)    Pcn [Penicillins] Other (comments)    Tetracyclines Other (comments)       MEDICATIONS  Current Outpatient Medications   Medication Sig    apixaban (ELIQUIS) 2.5 mg tablet Take 2.5 mg by mouth two (2) times a day.  aspirin delayed-release 81 mg tablet Take 1 Tab by mouth daily.  ALPRAZolam (XANAX) 2 mg tablet Take 2 mg by mouth three (3) times daily. No current facility-administered medications for this visit. SYSTEM REVIEW NOT RELATED TO LIVER DISEASE OR REVIEWED ABOVE:  Constitution systems: Negative for fever, chills, weight gain, weight loss. Eyes: Negative for visual changes. ENT: Negative for sore throat, painful swallowing. Respiratory: Negative for cough, hemoptysis, SOB. Cardiology: Negative for chest pain, palpitations. GI:  Negative for constipation or diarrhea. : Negative for urinary frequency, dysuria, hematuria, nocturia. Skin: Negative for rash. Hematology: Negative for easy bruising, blood clots. Musculo-skelatal: Negative for back pain, muscle pain, weakness. Neurologic: Negative for headaches, dizziness, vertigo, memory problems not related to HE. Psychology: Negative for anxiety, depression. FAMILY HISTORY:  The father  of parkinsons disease. The mother  of heart disease.     There is a brother with heart disease. There are no other persons in the family with HCV or liver disease. Her spouse passed away in 97/3523 from complications of cirrhosis secondary to HCV. SOCIAL HISTORY:  The patient is . The patient has no children. The patient currently smokes 1 pack of tobacco daily. The patient consumes alcohol on social occasions never in excess. The patient used to work as a . The patient has not worked since [employer]. PHYSICAL EXAMINATION:  Visit Vitals  /82 (BP 1 Location: Right arm, BP Patient Position: Sitting)   Pulse 85   Temp 97.1 °F (36.2 °C) (Tympanic)   Resp 18   Ht 5' 3\" (1.6 m)   Wt 102 lb 6.4 oz (46.4 kg)   SpO2 96%   BMI 18.14 kg/m²     General: No acute distress. Eyes: Sclera anicteric. ENT: No oral lesions. Thyroid normal.  Nodes: No adenopathy. Skin: No spider angiomata. No jaundice. No palmar erythema. Respiratory: Lungs clear to auscultation. Cardiovascular: Regular heart rate. No murmurs. No JVD. Abdomen: Soft non-tender. Liver size normal to percussion/palpation. Spleen not palpable. No obvious ascites. Extremities: No edema. No muscle wasting. No gross arthritic changes. Neurologic: Alert and oriented. Cranial nerves grossly intact. No asterixsis.     LABORATORY STUDIES:  [facility] [place] of [address] [place] [address] & Units 12/20/2018 8/28/2018   WBC 4.6 - 13.2 K/uL 4.8 5.2   ANC 1.8 - 8.0 K/UL 3.0    HGB 12.0 - 16.0 g/dL 13.8 12.5    - 420 K/uL 177 147   INR 0.8 - 1.2       AST 15 - 37 U/L 15    ALT 13 - 56 U/L 17    Alk Phos 45 - 117 U/L 53    Bili, Total 0.2 - 1.0 MG/DL 0.3    Bili, Direct 0.0 - 0.2 MG/DL 0.1    Albumin 3.4 - 5.0 g/dL 4.4    BUN 7.0 - 18 MG/DL 11 12   Creat 0.6 - 1.3 MG/DL 0.76 0.70   Na 136 - 145 mmol/L 141 142   K 3.5 - 5.5 mmol/L 4.7 4.1   Cl 100 - 108 mmol/L 107 107   CO2 21 - 32 mmol/L 29 25   Glucose 74 - 99 mg/dL 90 87   Magnesium 1.6 - 2.6 mg/dL       [facility] [place] of [address] [place] [address] Ref Rng & Units 8/27/2018   WBC 4.6 - 13.2 K/uL 5.0   ANC 1.8 - 8.0 K/UL 3.3   HGB 12.0 - 16.0 g/dL 13.2    - 420 K/uL 159   INR 0.8 - 1.2   1.0   AST 15 - 37 U/L 43 (H)   ALT 13 - 56 U/L 19   Alk Phos 45 - 117 U/L 46   Bili, Total 0.2 - 1.0 MG/DL 0.5   Bili, Direct 0.0 - 0.2 MG/DL    Albumin 3.4 - 5.0 g/dL 4.4   BUN 7.0 - 18 MG/DL 15   Creat 0.6 - 1.3 MG/DL 0.76   Na 136 - 145 mmol/L 139   K 3.5 - 5.5 mmol/L 5.0   Cl 100 - 108 mmol/L 103   CO2 21 - 32 mmol/L 28   Glucose 74 - 99 mg/dL 92   Magnesium 1.6 - 2.6 mg/dL 2.4     SEROLOGIES:  Serologies Latest Ref Rng 11/5/2014 9/23/2014   Hep A Ab, Total Negative   Negative   Hep C Genotype See Note 4  CANCELED     11/2014. HBsurface antigen negative, anti-HBcore positive, anti-HCV positive, HCV RNA Log 6.5 IU/ml, anti-HCV negative. LIVER HISTOLOGY:  11/2014. Fibrosure labs test.  F1.   11/2017. TRANSIENT HEPATIC ELASTOGRAPHY:   E Range: 8.12-11.94 kPa  E Mean: 10.09 kPa  E Median: 9.86 kPa  E Std: 1.26 kPa    ENDOSCOPIC PROCEDURES:  Not available or performed    RADIOLOGY:  10/2014. Ultrasound of the liver. Normal appearing liver. No masses. 11/2017. Ultrasound of the liver. Heterogeneous, coarse hepatic echotexture with peripheral micronodular nodularity. Sonographic findings of hepatic cirrhosis. No focal mass. OTHER TESTING:  Not available or performed    ASSESSMENT AND PLAN:  Chronic HCV with Fibrosure suggesting stage 1 fibrosis. The most recent laboratory studies indicate that the liver transaminases are normal, alkaline phosphatase is normal, tests of hepatic synthetic and metabolic function are normal, and the platelet count is normal.  Based upon imaging and laboratory studies the patient does not appear to have advanced liver disease or cirrhosis. Will perform laboratory testing to monitor liver function and degree of liver injury. This will include hepatic panel, a CBC w/ diff, a BMP, and HCV RNA.    HCV.   The patient has genotype 4. She was treated with 12 weeks of [facility] from 03/30/2016 through 06/21/2016. She had not detectable virus 12 weeks post treatment. She has not previously been treated. All complaints related to treatment have resolved. Patient is cured of HCV. The patient was directed to continue all current medications at the current dosages. There are no contraindications for the patient to take any medications that are necessary for treatment of other medical issues. The patient was counseled regarding alcohol consumption. Vaccination for viral hepatitis A is recommended since the patient has no serologic evidence of previous exposure or vaccination with immunity. Vaccination for viral hepatitis B is not needed. The patient has serologic evidence of prior exposure or vaccination with immunity. Follow-up [address] on a PRN basis.      IDALIA Rob  [facility] [place] of [address] [place] [address], [address] [place] [address] [facility], [address] [place] [address] Street - [address]  [phone]

## 2018-12-28 LAB
HCV RNA SERPL NAA+PROBE-LOG IU: NOT DETECTED HCV LOG 10IU/ML
HCV RNA SERPL PROBE AMP-ACNC: NOT DETECTED HCVIU/ML